# Patient Record
Sex: FEMALE | Race: OTHER | HISPANIC OR LATINO
[De-identification: names, ages, dates, MRNs, and addresses within clinical notes are randomized per-mention and may not be internally consistent; named-entity substitution may affect disease eponyms.]

---

## 2017-01-12 ENCOUNTER — APPOINTMENT (OUTPATIENT)
Dept: UROGYNECOLOGY | Facility: CLINIC | Age: 37
End: 2017-01-12

## 2017-01-25 ENCOUNTER — RECORD ABSTRACTING (OUTPATIENT)
Age: 37
End: 2017-01-25

## 2017-01-25 DIAGNOSIS — Z87.442 PERSONAL HISTORY OF URINARY CALCULI: ICD-10-CM

## 2017-01-30 ENCOUNTER — APPOINTMENT (OUTPATIENT)
Dept: UROGYNECOLOGY | Facility: CLINIC | Age: 37
End: 2017-01-30

## 2017-02-03 ENCOUNTER — APPOINTMENT (OUTPATIENT)
Dept: GASTROENTEROLOGY | Facility: CLINIC | Age: 37
End: 2017-02-03

## 2017-03-28 ENCOUNTER — APPOINTMENT (OUTPATIENT)
Dept: DERMATOLOGY | Facility: CLINIC | Age: 37
End: 2017-03-28

## 2017-06-06 ENCOUNTER — APPOINTMENT (OUTPATIENT)
Dept: OBGYN | Facility: CLINIC | Age: 37
End: 2017-06-06

## 2017-06-06 ENCOUNTER — OUTPATIENT (OUTPATIENT)
Dept: OUTPATIENT SERVICES | Facility: HOSPITAL | Age: 37
LOS: 1 days | Discharge: HOME | End: 2017-06-06

## 2017-06-06 VITALS
HEIGHT: 60 IN | BODY MASS INDEX: 31.22 KG/M2 | DIASTOLIC BLOOD PRESSURE: 60 MMHG | SYSTOLIC BLOOD PRESSURE: 110 MMHG | WEIGHT: 159 LBS

## 2017-06-06 DIAGNOSIS — N10 ACUTE PYELONEPHRITIS: ICD-10-CM

## 2017-06-13 ENCOUNTER — RESULT REVIEW (OUTPATIENT)
Age: 37
End: 2017-06-13

## 2017-06-14 LAB
APPEARANCE UR: CLEAR
BILIRUB UR QL STRIP: NEGATIVE
COLOR UR: YELLOW
GLUCOSE UR STRIP-MCNC: NEGATIVE MG/DL
HGB UR QL STRIP: NEGATIVE
KETONES UR STRIP-MCNC: NEGATIVE MG/DL
NITRITE UR QL STRIP: NEGATIVE
PH UR STRIP: 6
PROT UR STRIP-MCNC: NEGATIVE MG/DL
SP GR UR STRIP: 1.01
UROBILINOGEN UR STRIP-MCNC: 0.2 MG/DL
WBC URNS QL MICRO: NEGATIVE

## 2017-06-15 LAB — BACTERIA UR CULT: NORMAL

## 2017-06-22 ENCOUNTER — APPOINTMENT (OUTPATIENT)
Dept: UROLOGY | Facility: CLINIC | Age: 37
End: 2017-06-22

## 2017-06-28 DIAGNOSIS — D25.9 LEIOMYOMA OF UTERUS, UNSPECIFIED: ICD-10-CM

## 2017-08-25 ENCOUNTER — OUTPATIENT (OUTPATIENT)
Dept: OUTPATIENT SERVICES | Facility: HOSPITAL | Age: 37
LOS: 1 days | Discharge: HOME | End: 2017-08-25

## 2017-08-25 DIAGNOSIS — N10 ACUTE PYELONEPHRITIS: ICD-10-CM

## 2017-08-26 ENCOUNTER — OUTPATIENT (OUTPATIENT)
Dept: OUTPATIENT SERVICES | Facility: HOSPITAL | Age: 37
LOS: 1 days | Discharge: HOME | End: 2017-08-26

## 2017-08-26 DIAGNOSIS — N10 ACUTE PYELONEPHRITIS: ICD-10-CM

## 2017-08-26 DIAGNOSIS — E66.09 OTHER OBESITY DUE TO EXCESS CALORIES: ICD-10-CM

## 2017-12-12 ENCOUNTER — APPOINTMENT (OUTPATIENT)
Dept: OBGYN | Facility: CLINIC | Age: 37
End: 2017-12-12

## 2018-03-15 ENCOUNTER — APPOINTMENT (OUTPATIENT)
Dept: SURGERY | Facility: CLINIC | Age: 38
End: 2018-03-15

## 2018-08-29 ENCOUNTER — EMERGENCY (EMERGENCY)
Facility: HOSPITAL | Age: 38
LOS: 0 days | Discharge: HOME | End: 2018-08-29
Attending: EMERGENCY MEDICINE | Admitting: EMERGENCY MEDICINE

## 2018-08-29 VITALS
TEMPERATURE: 99 F | DIASTOLIC BLOOD PRESSURE: 65 MMHG | HEART RATE: 74 BPM | OXYGEN SATURATION: 98 % | RESPIRATION RATE: 20 BRPM | SYSTOLIC BLOOD PRESSURE: 106 MMHG

## 2018-08-29 DIAGNOSIS — N39.0 URINARY TRACT INFECTION, SITE NOT SPECIFIED: ICD-10-CM

## 2018-08-29 DIAGNOSIS — R10.9 UNSPECIFIED ABDOMINAL PAIN: ICD-10-CM

## 2018-08-29 DIAGNOSIS — M54.5 LOW BACK PAIN: ICD-10-CM

## 2018-08-29 LAB
ANION GAP SERPL CALC-SCNC: 10 MMOL/L — SIGNIFICANT CHANGE UP (ref 7–14)
APPEARANCE UR: CLEAR — SIGNIFICANT CHANGE UP
BASOPHILS # BLD AUTO: 0.07 K/UL — SIGNIFICANT CHANGE UP (ref 0–0.2)
BASOPHILS NFR BLD AUTO: 0.6 % — SIGNIFICANT CHANGE UP (ref 0–1)
BILIRUB UR-MCNC: NEGATIVE — SIGNIFICANT CHANGE UP
BUN SERPL-MCNC: 8 MG/DL — LOW (ref 10–20)
CALCIUM SERPL-MCNC: 8.4 MG/DL — LOW (ref 8.5–10.1)
CHLORIDE SERPL-SCNC: 106 MMOL/L — SIGNIFICANT CHANGE UP (ref 98–110)
CO2 SERPL-SCNC: 23 MMOL/L — SIGNIFICANT CHANGE UP (ref 17–32)
COLOR SPEC: YELLOW — SIGNIFICANT CHANGE UP
CREAT SERPL-MCNC: 0.5 MG/DL — LOW (ref 0.7–1.5)
DIFF PNL FLD: ABNORMAL
EOSINOPHIL # BLD AUTO: 0.18 K/UL — SIGNIFICANT CHANGE UP (ref 0–0.7)
EOSINOPHIL NFR BLD AUTO: 1.5 % — SIGNIFICANT CHANGE UP (ref 0–8)
EPI CELLS # UR: ABNORMAL /HPF
GLUCOSE SERPL-MCNC: 78 MG/DL — SIGNIFICANT CHANGE UP (ref 70–99)
GLUCOSE UR QL: NEGATIVE MG/DL — SIGNIFICANT CHANGE UP
HCT VFR BLD CALC: 37.4 % — SIGNIFICANT CHANGE UP (ref 37–47)
HGB BLD-MCNC: 12.7 G/DL — SIGNIFICANT CHANGE UP (ref 12–16)
IMM GRANULOCYTES NFR BLD AUTO: 0.4 % — HIGH (ref 0.1–0.3)
KETONES UR-MCNC: NEGATIVE — SIGNIFICANT CHANGE UP
LACTATE SERPL-SCNC: 0.9 MMOL/L — SIGNIFICANT CHANGE UP (ref 0.5–2.2)
LEUKOCYTE ESTERASE UR-ACNC: ABNORMAL
LYMPHOCYTES # BLD AUTO: 2.95 K/UL — SIGNIFICANT CHANGE UP (ref 1.2–3.4)
LYMPHOCYTES # BLD AUTO: 24.6 % — SIGNIFICANT CHANGE UP (ref 20.5–51.1)
MCHC RBC-ENTMCNC: 30.4 PG — SIGNIFICANT CHANGE UP (ref 27–31)
MCHC RBC-ENTMCNC: 34 G/DL — SIGNIFICANT CHANGE UP (ref 32–37)
MCV RBC AUTO: 89.5 FL — SIGNIFICANT CHANGE UP (ref 81–99)
MONOCYTES # BLD AUTO: 0.91 K/UL — HIGH (ref 0.1–0.6)
MONOCYTES NFR BLD AUTO: 7.6 % — SIGNIFICANT CHANGE UP (ref 1.7–9.3)
NEUTROPHILS # BLD AUTO: 7.82 K/UL — HIGH (ref 1.4–6.5)
NEUTROPHILS NFR BLD AUTO: 65.3 % — SIGNIFICANT CHANGE UP (ref 42.2–75.2)
NITRITE UR-MCNC: NEGATIVE — SIGNIFICANT CHANGE UP
NRBC # BLD: 0 /100 WBCS — SIGNIFICANT CHANGE UP (ref 0–0)
PH UR: 6.5 — SIGNIFICANT CHANGE UP (ref 5–8)
PLATELET # BLD AUTO: 300 K/UL — SIGNIFICANT CHANGE UP (ref 130–400)
POTASSIUM SERPL-MCNC: 4 MMOL/L — SIGNIFICANT CHANGE UP (ref 3.5–5)
POTASSIUM SERPL-SCNC: 4 MMOL/L — SIGNIFICANT CHANGE UP (ref 3.5–5)
PROT UR-MCNC: NEGATIVE MG/DL — SIGNIFICANT CHANGE UP
RBC # BLD: 4.18 M/UL — LOW (ref 4.2–5.4)
RBC # FLD: 13.5 % — SIGNIFICANT CHANGE UP (ref 11.5–14.5)
RBC CASTS # UR COMP ASSIST: SIGNIFICANT CHANGE UP /HPF
SODIUM SERPL-SCNC: 139 MMOL/L — SIGNIFICANT CHANGE UP (ref 135–146)
SP GR SPEC: <=1.005 — SIGNIFICANT CHANGE UP (ref 1.01–1.03)
UROBILINOGEN FLD QL: 0.2 MG/DL — SIGNIFICANT CHANGE UP (ref 0.2–0.2)
WBC # BLD: 11.98 K/UL — HIGH (ref 4.8–10.8)
WBC # FLD AUTO: 11.98 K/UL — HIGH (ref 4.8–10.8)
WBC UR QL: ABNORMAL /HPF

## 2018-08-29 RX ORDER — KETOROLAC TROMETHAMINE 30 MG/ML
15 SYRINGE (ML) INJECTION ONCE
Qty: 0 | Refills: 0 | Status: DISCONTINUED | OUTPATIENT
Start: 2018-08-29 | End: 2018-08-29

## 2018-08-29 RX ORDER — SODIUM CHLORIDE 9 MG/ML
1000 INJECTION INTRAMUSCULAR; INTRAVENOUS; SUBCUTANEOUS ONCE
Qty: 0 | Refills: 0 | Status: COMPLETED | OUTPATIENT
Start: 2018-08-29 | End: 2018-08-29

## 2018-08-29 RX ORDER — NITROFURANTOIN MACROCRYSTAL 50 MG
1 CAPSULE ORAL
Qty: 14 | Refills: 0 | OUTPATIENT
Start: 2018-08-29 | End: 2018-09-04

## 2018-08-29 RX ORDER — METHOCARBAMOL 500 MG/1
1000 TABLET, FILM COATED ORAL ONCE
Qty: 0 | Refills: 0 | Status: COMPLETED | OUTPATIENT
Start: 2018-08-29 | End: 2018-08-29

## 2018-08-29 RX ORDER — IBUPROFEN 200 MG
1 TABLET ORAL
Qty: 21 | Refills: 0 | OUTPATIENT
Start: 2018-08-29 | End: 2018-09-04

## 2018-08-29 RX ORDER — MORPHINE SULFATE 50 MG/1
4 CAPSULE, EXTENDED RELEASE ORAL ONCE
Qty: 0 | Refills: 0 | Status: DISCONTINUED | OUTPATIENT
Start: 2018-08-29 | End: 2018-08-29

## 2018-08-29 RX ORDER — METHOCARBAMOL 500 MG/1
1 TABLET, FILM COATED ORAL
Qty: 21 | Refills: 0 | OUTPATIENT
Start: 2018-08-29 | End: 2018-09-04

## 2018-08-29 RX ADMIN — Medication 15 MILLIGRAM(S): at 09:35

## 2018-08-29 RX ADMIN — SODIUM CHLORIDE 1000 MILLILITER(S): 9 INJECTION INTRAMUSCULAR; INTRAVENOUS; SUBCUTANEOUS at 09:19

## 2018-08-29 RX ADMIN — Medication 15 MILLIGRAM(S): at 12:04

## 2018-08-29 RX ADMIN — MORPHINE SULFATE 4 MILLIGRAM(S): 50 CAPSULE, EXTENDED RELEASE ORAL at 12:04

## 2018-08-29 RX ADMIN — MORPHINE SULFATE 4 MILLIGRAM(S): 50 CAPSULE, EXTENDED RELEASE ORAL at 09:50

## 2018-08-29 RX ADMIN — SODIUM CHLORIDE 1000 MILLILITER(S): 9 INJECTION INTRAMUSCULAR; INTRAVENOUS; SUBCUTANEOUS at 12:04

## 2018-08-29 RX ADMIN — METHOCARBAMOL 1000 MILLIGRAM(S): 500 TABLET, FILM COATED ORAL at 09:35

## 2018-08-29 NOTE — ED PROVIDER NOTE - ATTENDING CONTRIBUTION TO CARE
37 y/o F here with L lower back pain x 1 week.  Not relieved with tylenol or ibuprofen this week.  Also noted dysuria today. no fever.  h/o pyelonephritis in the past that feels the same.  no vag d/c or bleeding.  LMP 3 weeks ago. No n/v/d.  normal, but frequent BMs.  No PMH.  NKDA.  EXAM: well appearing. NAD. s1s2, reg. CTAB. abd soft, nd, nt.  +l lower back/CVAT.  Pain worse with trying to sit up and tristing to the right.  P: labs, ua, uhcg, ivf.

## 2018-08-29 NOTE — ED PROVIDER NOTE - MEDICAL DECISION MAKING DETAILS
Pt feels better. Ready for d/c home.  Will treat with abx for dysuria and muscle relaxant for the back pain.

## 2018-08-29 NOTE — ED PROVIDER NOTE - OBJECTIVE STATEMENT
37 y/o female presents to the ED c/o "I have left side pain for 1 week. I also have lower stomach pressure, frequent urination, burning and discomfort since this morning. " no n/v/d/ fever/ weakness/ hx trauma/ heavy lifting

## 2018-08-29 NOTE — ED ADULT NURSE NOTE - NSIMPLEMENTINTERV_GEN_ALL_ED
Implemented All Universal Safety Interventions:  [place] to call system. Call bell, personal items and telephone within reach. Instruct patient to call for assistance. Room bathroom lighting operational. Non-slip footwear when patient is off stretcher. Physically safe environment: no spills, clutter or unnecessary equipment. Stretcher in lowest position, wheels locked, appropriate side rails in place.

## 2018-08-30 LAB
CULTURE RESULTS: NO GROWTH — SIGNIFICANT CHANGE UP
SPECIMEN SOURCE: SIGNIFICANT CHANGE UP

## 2018-09-17 ENCOUNTER — EMERGENCY (EMERGENCY)
Facility: HOSPITAL | Age: 38
LOS: 0 days | Discharge: HOME | End: 2018-09-17
Admitting: EMERGENCY MEDICINE

## 2018-09-17 VITALS
DIASTOLIC BLOOD PRESSURE: 74 MMHG | SYSTOLIC BLOOD PRESSURE: 128 MMHG | RESPIRATION RATE: 18 BRPM | OXYGEN SATURATION: 96 % | TEMPERATURE: 99 F | HEART RATE: 82 BPM

## 2018-09-17 VITALS
OXYGEN SATURATION: 98 % | RESPIRATION RATE: 18 BRPM | DIASTOLIC BLOOD PRESSURE: 75 MMHG | TEMPERATURE: 100 F | SYSTOLIC BLOOD PRESSURE: 111 MMHG | HEART RATE: 90 BPM

## 2018-09-17 DIAGNOSIS — Z79.1 LONG TERM (CURRENT) USE OF NON-STEROIDAL ANTI-INFLAMMATORIES (NSAID): ICD-10-CM

## 2018-09-17 DIAGNOSIS — Z79.899 OTHER LONG TERM (CURRENT) DRUG THERAPY: ICD-10-CM

## 2018-09-17 DIAGNOSIS — Z79.2 LONG TERM (CURRENT) USE OF ANTIBIOTICS: ICD-10-CM

## 2018-09-17 DIAGNOSIS — Z87.440 PERSONAL HISTORY OF URINARY (TRACT) INFECTIONS: ICD-10-CM

## 2018-09-17 DIAGNOSIS — R50.9 FEVER, UNSPECIFIED: ICD-10-CM

## 2018-09-17 DIAGNOSIS — N83.202 UNSPECIFIED OVARIAN CYST, LEFT SIDE: ICD-10-CM

## 2018-09-17 LAB
ANION GAP SERPL CALC-SCNC: 18 MMOL/L — HIGH (ref 7–14)
APPEARANCE UR: CLEAR — SIGNIFICANT CHANGE UP
BASOPHILS # BLD AUTO: 0.05 K/UL — SIGNIFICANT CHANGE UP (ref 0–0.2)
BASOPHILS NFR BLD AUTO: 0.9 % — SIGNIFICANT CHANGE UP (ref 0–1)
BILIRUB UR-MCNC: NEGATIVE — SIGNIFICANT CHANGE UP
BUN SERPL-MCNC: 8 MG/DL — LOW (ref 10–20)
CALCIUM SERPL-MCNC: 9.2 MG/DL — SIGNIFICANT CHANGE UP (ref 8.5–10.1)
CHLORIDE SERPL-SCNC: 100 MMOL/L — SIGNIFICANT CHANGE UP (ref 98–110)
CO2 SERPL-SCNC: 22 MMOL/L — SIGNIFICANT CHANGE UP (ref 17–32)
COLOR SPEC: YELLOW — SIGNIFICANT CHANGE UP
CREAT SERPL-MCNC: 0.6 MG/DL — LOW (ref 0.7–1.5)
DIFF PNL FLD: ABNORMAL
EOSINOPHIL # BLD AUTO: 0.13 K/UL — SIGNIFICANT CHANGE UP (ref 0–0.7)
EOSINOPHIL NFR BLD AUTO: 2.4 % — SIGNIFICANT CHANGE UP (ref 0–8)
GLUCOSE SERPL-MCNC: 148 MG/DL — HIGH (ref 70–99)
GLUCOSE UR QL: NEGATIVE MG/DL — SIGNIFICANT CHANGE UP
HCT VFR BLD CALC: 40 % — SIGNIFICANT CHANGE UP (ref 37–47)
HGB BLD-MCNC: 13.9 G/DL — SIGNIFICANT CHANGE UP (ref 12–16)
IMM GRANULOCYTES NFR BLD AUTO: 0.4 % — HIGH (ref 0.1–0.3)
KETONES UR-MCNC: NEGATIVE — SIGNIFICANT CHANGE UP
LACTATE SERPL-SCNC: 2 MMOL/L — SIGNIFICANT CHANGE UP (ref 0.5–2.2)
LEUKOCYTE ESTERASE UR-ACNC: NEGATIVE — SIGNIFICANT CHANGE UP
LYMPHOCYTES # BLD AUTO: 1.47 K/UL — SIGNIFICANT CHANGE UP (ref 1.2–3.4)
LYMPHOCYTES # BLD AUTO: 27.3 % — SIGNIFICANT CHANGE UP (ref 20.5–51.1)
MCHC RBC-ENTMCNC: 30.8 PG — SIGNIFICANT CHANGE UP (ref 27–31)
MCHC RBC-ENTMCNC: 34.8 G/DL — SIGNIFICANT CHANGE UP (ref 32–37)
MCV RBC AUTO: 88.5 FL — SIGNIFICANT CHANGE UP (ref 81–99)
MONOCYTES # BLD AUTO: 0.9 K/UL — HIGH (ref 0.1–0.6)
MONOCYTES NFR BLD AUTO: 16.7 % — HIGH (ref 1.7–9.3)
NEUTROPHILS # BLD AUTO: 2.81 K/UL — SIGNIFICANT CHANGE UP (ref 1.4–6.5)
NEUTROPHILS NFR BLD AUTO: 52.3 % — SIGNIFICANT CHANGE UP (ref 42.2–75.2)
NITRITE UR-MCNC: NEGATIVE — SIGNIFICANT CHANGE UP
NRBC # BLD: 0 /100 WBCS — SIGNIFICANT CHANGE UP (ref 0–0)
PH UR: 6.5 — SIGNIFICANT CHANGE UP (ref 5–8)
PLATELET # BLD AUTO: 273 K/UL — SIGNIFICANT CHANGE UP (ref 130–400)
POTASSIUM SERPL-MCNC: 3.9 MMOL/L — SIGNIFICANT CHANGE UP (ref 3.5–5)
POTASSIUM SERPL-SCNC: 3.9 MMOL/L — SIGNIFICANT CHANGE UP (ref 3.5–5)
PROT UR-MCNC: NEGATIVE MG/DL — SIGNIFICANT CHANGE UP
RBC # BLD: 4.52 M/UL — SIGNIFICANT CHANGE UP (ref 4.2–5.4)
RBC # FLD: 13.2 % — SIGNIFICANT CHANGE UP (ref 11.5–14.5)
RBC CASTS # UR COMP ASSIST: ABNORMAL /HPF
SODIUM SERPL-SCNC: 140 MMOL/L — SIGNIFICANT CHANGE UP (ref 135–146)
SP GR SPEC: 1.01 — SIGNIFICANT CHANGE UP (ref 1.01–1.03)
UROBILINOGEN FLD QL: 0.2 MG/DL — SIGNIFICANT CHANGE UP (ref 0.2–0.2)
WBC # BLD: 5.38 K/UL — SIGNIFICANT CHANGE UP (ref 4.8–10.8)
WBC # FLD AUTO: 5.38 K/UL — SIGNIFICANT CHANGE UP (ref 4.8–10.8)

## 2018-09-17 RX ORDER — KETOROLAC TROMETHAMINE 30 MG/ML
30 SYRINGE (ML) INJECTION ONCE
Qty: 0 | Refills: 0 | Status: DISCONTINUED | OUTPATIENT
Start: 2018-09-17 | End: 2018-09-17

## 2018-09-17 RX ADMIN — Medication 30 MILLIGRAM(S): at 19:39

## 2018-09-17 NOTE — ED ADULT NURSE NOTE - NSIMPLEMENTINTERV_GEN_ALL_ED
Implemented All Universal Safety Interventions:  [place] to call system. Call bell, personal items and telephone within reach. Instruct patient to call for assistance. Room bathroom lighting operational. Non-slip footwear when patient is off stretcher. Physically safe environment: no spills, clutter or unnecessary equipment. Stretcher in lowest position, wheels locked, appropriate side rails in place.

## 2018-09-17 NOTE — ED PROVIDER NOTE - OBJECTIVE STATEMENT
Patient is a 38 years old F presents to the ED for evaluation of fever and pelvic pain x weeks. As per patient she was seen here earlier this month for same complaint and diagnosed with UTI for which she started on abx but now sts vaginal pain/suprapubic with continued left flank pain. Temp of 101 at home, no nausea, vomiting, diarrhea, no recent travel, +son with sore throat and fever (also being seen today in the ED).

## 2018-09-17 NOTE — ED PROVIDER NOTE - PHYSICAL EXAMINATION
Gen: Alert, NAD, well appearing  Pulm: Bilateral BS, normal resp effort, no wheeze/stridor/retractions  CV: RRR, no M/R/G, +dist pulses  Abd: soft, +suprapubic tenderness  Pelvic: (Chaperone by DEB Zapata) Normal vaginal exam minimal white d/c no cmt no left adnexa tenderness   Neuro: AAOx3

## 2018-09-17 NOTE — ED PROVIDER NOTE - NS ED ROS FT
Review of Systems  Constitutional: (+) fever  Eyes/ENT: (-) blurry vision, (-) epistaxis  Cardiovascular: (-) chest pain, (-) syncope  Respiratory: (-) cough, (-) shortness of breath  Gastrointestinal: (-) vomiting, (-) diarrhea  Musculoskeletal: (-) neck pain, (+) back pain, (-) joint pain  Integumentary: (-) rash, (-) edema  Neurological: (-) headache

## 2018-09-18 LAB
C TRACH RRNA SPEC QL NAA+PROBE: SIGNIFICANT CHANGE UP
N GONORRHOEA RRNA SPEC QL NAA+PROBE: SIGNIFICANT CHANGE UP
SPECIMEN SOURCE: SIGNIFICANT CHANGE UP

## 2018-10-02 ENCOUNTER — OUTPATIENT (OUTPATIENT)
Dept: OUTPATIENT SERVICES | Facility: HOSPITAL | Age: 38
LOS: 1 days | Discharge: HOME | End: 2018-10-02

## 2018-10-02 ENCOUNTER — APPOINTMENT (OUTPATIENT)
Dept: OBGYN | Facility: CLINIC | Age: 38
End: 2018-10-02

## 2018-10-02 VITALS — SYSTOLIC BLOOD PRESSURE: 120 MMHG | DIASTOLIC BLOOD PRESSURE: 78 MMHG

## 2018-10-02 PROBLEM — D25.9 LEIOMYOMA OF UTERUS, UNSPECIFIED: Chronic | Status: ACTIVE | Noted: 2018-09-17

## 2018-10-02 PROBLEM — N83.209 UNSPECIFIED OVARIAN CYST, UNSPECIFIED SIDE: Chronic | Status: ACTIVE | Noted: 2018-09-17

## 2018-10-02 PROBLEM — N39.0 URINARY TRACT INFECTION, SITE NOT SPECIFIED: Chronic | Status: ACTIVE | Noted: 2018-09-17

## 2018-10-05 ENCOUNTER — LABORATORY RESULT (OUTPATIENT)
Age: 38
End: 2018-10-05

## 2018-10-09 LAB
C TRACH RRNA SPEC QL NAA+PROBE: NOT DETECTED
N GONORRHOEA RRNA SPEC QL NAA+PROBE: NOT DETECTED
SOURCE AMPLIFICATION: NORMAL

## 2018-10-11 DIAGNOSIS — Z01.411 ENCOUNTER FOR GYNECOLOGICAL EXAMINATION (GENERAL) (ROUTINE) WITH ABNORMAL FINDINGS: ICD-10-CM

## 2018-10-12 ENCOUNTER — APPOINTMENT (OUTPATIENT)
Dept: ANTEPARTUM | Facility: CLINIC | Age: 38
End: 2018-10-12

## 2018-10-15 DIAGNOSIS — D25.1 INTRAMURAL LEIOMYOMA OF UTERUS: ICD-10-CM

## 2018-10-16 LAB — HPV HIGH+LOW RISK DNA PNL CVX: NOT DETECTED

## 2018-11-06 ENCOUNTER — APPOINTMENT (OUTPATIENT)
Dept: OBGYN | Facility: CLINIC | Age: 38
End: 2018-11-06

## 2018-11-23 ENCOUNTER — OUTPATIENT (OUTPATIENT)
Dept: OUTPATIENT SERVICES | Facility: HOSPITAL | Age: 38
LOS: 1 days | Discharge: HOME | End: 2018-11-23

## 2018-11-23 ENCOUNTER — APPOINTMENT (OUTPATIENT)
Dept: UROGYNECOLOGY | Facility: CLINIC | Age: 38
End: 2018-11-23

## 2018-11-23 VITALS
SYSTOLIC BLOOD PRESSURE: 106 MMHG | DIASTOLIC BLOOD PRESSURE: 70 MMHG | HEIGHT: 60 IN | WEIGHT: 161 LBS | BODY MASS INDEX: 31.61 KG/M2

## 2018-11-23 DIAGNOSIS — R93.41 ABNORMAL RADIOLOGIC FINDINGS ON DIAGNOSTIC IMAGING OF OF RENAL PELVIS, URETER, OR BLADDER: ICD-10-CM

## 2018-11-23 DIAGNOSIS — Z87.448 PERSONAL HISTORY OF OTHER DISEASES OF URINARY SYSTEM: ICD-10-CM

## 2018-11-23 DIAGNOSIS — Z87.440 PERSONAL HISTORY OF URINARY (TRACT) INFECTIONS: ICD-10-CM

## 2018-11-23 DIAGNOSIS — M54.42 LUMBAGO WITH SCIATICA, LEFT SIDE: ICD-10-CM

## 2018-11-23 DIAGNOSIS — Z87.898 PERSONAL HISTORY OF OTHER SPECIFIED CONDITIONS: ICD-10-CM

## 2018-11-23 DIAGNOSIS — R21 RASH AND OTHER NONSPECIFIC SKIN ERUPTION: ICD-10-CM

## 2018-11-23 DIAGNOSIS — Z86.018 PERSONAL HISTORY OF OTHER BENIGN NEOPLASM: ICD-10-CM

## 2018-11-23 DIAGNOSIS — N81.6 RECTOCELE: ICD-10-CM

## 2018-11-23 DIAGNOSIS — N02.9 RECURRENT AND PERSISTENT HEMATURIA WITH UNSPECIFIED MORPHOLOGIC CHANGES: ICD-10-CM

## 2018-11-23 DIAGNOSIS — M54.41 LUMBAGO WITH SCIATICA, LEFT SIDE: ICD-10-CM

## 2018-11-23 DIAGNOSIS — R10.9 UNSPECIFIED ABDOMINAL PAIN: ICD-10-CM

## 2018-11-23 DIAGNOSIS — N39.46 MIXED INCONTINENCE: ICD-10-CM

## 2018-11-23 DIAGNOSIS — N39.3 STRESS INCONTINENCE (FEMALE) (MALE): ICD-10-CM

## 2018-11-23 DIAGNOSIS — N89.8 OTHER SPECIFIED NONINFLAMMATORY DISORDERS OF VAGINA: ICD-10-CM

## 2018-11-23 DIAGNOSIS — R39.89 OTHER SYMPTOMS AND SIGNS INVOLVING THE GENITOURINARY SYSTEM: ICD-10-CM

## 2018-11-23 DIAGNOSIS — Z78.9 OTHER SPECIFIED HEALTH STATUS: ICD-10-CM

## 2018-11-23 DIAGNOSIS — Z12.72 ENCOUNTER FOR SCREENING FOR MALIGNANT NEOPLASM OF VAGINA: ICD-10-CM

## 2018-11-26 LAB
APPEARANCE: CLEAR
BACTERIA UR CULT: NORMAL
BILIRUBIN URINE: NEGATIVE
BLOOD URINE: NEGATIVE
COLOR: YELLOW
GLUCOSE QUALITATIVE U: NEGATIVE MG/DL
KETONES URINE: NEGATIVE
LEUKOCYTE ESTERASE URINE: NEGATIVE
NITRITE URINE: NEGATIVE
PH URINE: 6
PROTEIN URINE: NEGATIVE MG/DL
SPECIFIC GRAVITY URINE: 1.01
UROBILINOGEN URINE: 0.2 MG/DL (ref 0.2–?)

## 2018-11-29 DIAGNOSIS — K59.00 CONSTIPATION, UNSPECIFIED: ICD-10-CM

## 2018-11-29 DIAGNOSIS — M79.10 MYALGIA, UNSPECIFIED SITE: ICD-10-CM

## 2018-12-20 ENCOUNTER — RESULT CHARGE (OUTPATIENT)
Age: 38
End: 2018-12-20

## 2018-12-21 ENCOUNTER — APPOINTMENT (OUTPATIENT)
Dept: UROGYNECOLOGY | Facility: CLINIC | Age: 38
End: 2018-12-21

## 2018-12-21 ENCOUNTER — OUTPATIENT (OUTPATIENT)
Dept: OUTPATIENT SERVICES | Facility: HOSPITAL | Age: 38
LOS: 1 days | Discharge: HOME | End: 2018-12-21

## 2018-12-24 LAB
BILIRUB UR QL STRIP: NORMAL
CLARITY UR: CLEAR
COLLECTION METHOD: NORMAL
GLUCOSE UR-MCNC: NORMAL
HCG UR QL: 0.2 EU/DL
HCG UR QL: NEGATIVE
HGB UR QL STRIP.AUTO: NORMAL
KETONES UR-MCNC: NORMAL
LEUKOCYTE ESTERASE UR QL STRIP: NORMAL
NITRITE UR QL STRIP: NORMAL
PH UR STRIP: 6.5
PROT UR STRIP-MCNC: NORMAL
QUALITY CONTROL: YES
SP GR UR STRIP: 1.02

## 2018-12-26 DIAGNOSIS — N39.46 MIXED INCONTINENCE: ICD-10-CM

## 2018-12-26 DIAGNOSIS — M79.10 MYALGIA, UNSPECIFIED SITE: ICD-10-CM

## 2018-12-26 DIAGNOSIS — N81.6 RECTOCELE: ICD-10-CM

## 2018-12-27 ENCOUNTER — APPOINTMENT (OUTPATIENT)
Dept: OBGYN | Facility: CLINIC | Age: 38
End: 2018-12-27

## 2018-12-27 ENCOUNTER — OUTPATIENT (OUTPATIENT)
Dept: OUTPATIENT SERVICES | Facility: HOSPITAL | Age: 38
LOS: 1 days | Discharge: HOME | End: 2018-12-27

## 2018-12-27 VITALS
HEIGHT: 60 IN | BODY MASS INDEX: 32.48 KG/M2 | DIASTOLIC BLOOD PRESSURE: 82 MMHG | SYSTOLIC BLOOD PRESSURE: 118 MMHG | WEIGHT: 165.44 LBS

## 2018-12-28 DIAGNOSIS — R10.2 PELVIC AND PERINEAL PAIN: ICD-10-CM

## 2019-01-02 ENCOUNTER — OTHER (OUTPATIENT)
Age: 39
End: 2019-01-02

## 2019-01-14 ENCOUNTER — EMERGENCY (EMERGENCY)
Facility: HOSPITAL | Age: 39
LOS: 0 days | Discharge: HOME | End: 2019-01-14
Admitting: PHYSICIAN ASSISTANT

## 2019-01-14 VITALS
RESPIRATION RATE: 18 BRPM | TEMPERATURE: 99 F | SYSTOLIC BLOOD PRESSURE: 110 MMHG | HEART RATE: 84 BPM | DIASTOLIC BLOOD PRESSURE: 58 MMHG | OXYGEN SATURATION: 98 %

## 2019-01-14 VITALS — HEIGHT: 62 IN | WEIGHT: 162.04 LBS

## 2019-01-14 DIAGNOSIS — L50.9 URTICARIA, UNSPECIFIED: ICD-10-CM

## 2019-01-14 DIAGNOSIS — R11.2 NAUSEA WITH VOMITING, UNSPECIFIED: ICD-10-CM

## 2019-01-14 DIAGNOSIS — R21 RASH AND OTHER NONSPECIFIC SKIN ERUPTION: ICD-10-CM

## 2019-01-14 DIAGNOSIS — L29.9 PRURITUS, UNSPECIFIED: ICD-10-CM

## 2019-01-14 DIAGNOSIS — R35.0 FREQUENCY OF MICTURITION: ICD-10-CM

## 2019-01-14 LAB
APPEARANCE UR: CLEAR — SIGNIFICANT CHANGE UP
BILIRUB UR-MCNC: NEGATIVE — SIGNIFICANT CHANGE UP
COLOR SPEC: YELLOW — SIGNIFICANT CHANGE UP
DIFF PNL FLD: ABNORMAL
GLUCOSE UR QL: NEGATIVE MG/DL — SIGNIFICANT CHANGE UP
KETONES UR-MCNC: NEGATIVE — SIGNIFICANT CHANGE UP
LEUKOCYTE ESTERASE UR-ACNC: NEGATIVE — SIGNIFICANT CHANGE UP
NITRITE UR-MCNC: NEGATIVE — SIGNIFICANT CHANGE UP
PH UR: 6.5 — SIGNIFICANT CHANGE UP (ref 5–8)
PROT UR-MCNC: NEGATIVE MG/DL — SIGNIFICANT CHANGE UP
RBC CASTS # UR COMP ASSIST: SIGNIFICANT CHANGE UP /HPF
SP GR SPEC: <=1.005 — SIGNIFICANT CHANGE UP (ref 1.01–1.03)
UROBILINOGEN FLD QL: 0.2 MG/DL — SIGNIFICANT CHANGE UP (ref 0.2–0.2)

## 2019-01-14 RX ORDER — DIPHENHYDRAMINE HCL 50 MG
2 CAPSULE ORAL
Qty: 24 | Refills: 0 | OUTPATIENT
Start: 2019-01-14 | End: 2019-01-16

## 2019-01-14 RX ORDER — DEXAMETHASONE 0.5 MG/5ML
10 ELIXIR ORAL ONCE
Qty: 0 | Refills: 0 | Status: COMPLETED | OUTPATIENT
Start: 2019-01-14 | End: 2019-01-14

## 2019-01-14 RX ORDER — DIPHENHYDRAMINE HCL 50 MG
50 CAPSULE ORAL ONCE
Qty: 0 | Refills: 0 | Status: COMPLETED | OUTPATIENT
Start: 2019-01-14 | End: 2019-01-14

## 2019-01-14 RX ORDER — PERMETHRIN CREAM 5% W/W 50 MG/G
1 CREAM TOPICAL
Qty: 30 | Refills: 0 | OUTPATIENT
Start: 2019-01-14 | End: 2019-01-14

## 2019-01-14 RX ADMIN — Medication 50 MILLIGRAM(S): at 21:46

## 2019-01-14 RX ADMIN — Medication 10 MILLIGRAM(S): at 21:46

## 2019-01-14 NOTE — ED PROVIDER NOTE - MEDICAL DECISION MAKING DETAILS
Pt presents to the ED c/o diffuse pruritic rash. Exam shows erythematous papular rash with involvement of interdigit spaces. Given decadron/benadryl and prescribed permethrin. Pt instructed to follow-up with derm. Return precautions given. Agreeable to d/c.

## 2019-01-14 NOTE — ED ADULT NURSE NOTE - OBJECTIVE STATEMENT
Pt c/o of all over body hives that are small reddened and itchy for the past week. No new medications, food intake or allergens. No new lotions or creams or detergents. States benadryl did not help the one dose she took. Has became increasingly itchy over the past few days. No SOB, abdominal pain, facial swelling, throat swelling.

## 2019-01-14 NOTE — ED PROVIDER NOTE - PHYSICAL EXAMINATION
VITAL SIGNS: I have reviewed nursing notes and confirm.  CONSTITUTIONAL: Well-developed; well-nourished; in no acute distress.  SKIN: Skin exam is warm and dry. (+) diffuse erythematous papular rash, involving interdigit spaces. Non-tender. No involvement of palms and soles.   HEAD: Normocephalic; atraumatic.  EYES: Conjunctiva and sclera clear.  ENT: No nasal discharge; airway clear. Oropharynx clear. Uvula midline.   NECK: Supple; non tender.  CARD: S1, S2 normal; no murmurs, gallops, or rubs. Regular rate and rhythm.  RESP: No wheezes, rales or rhonchi. Speaking in full sentences.   EXT: Normal ROM. No clubbing, cyanosis or edema.  NEURO: Alert, oriented. Grossly unremarkable. No focal deficits.

## 2019-01-14 NOTE — ED PROVIDER NOTE - CARE PROVIDER_API CALL
Rick Houston), Dermatology; Internal Medicine  [address] [place] [address]  Phone: [phone]  Fax: [phone]

## 2019-01-14 NOTE — ED PROVIDER NOTE - OBJECTIVE STATEMENT
39 yo F with no significant PMHx presents to the ED c/o diffuse rash. Symptoms started 1 week ago and have been persisting. Pt admits to associated pruritis. Pt tried taking Benadryl once without improvement in symptoms. Pt denies fever, chills, nausea, vomiting, abdominal pain, chest pain, SOB, throat pain, sick contacts, recent travel.

## 2019-01-15 LAB
CULTURE RESULTS: NO GROWTH — SIGNIFICANT CHANGE UP
SPECIMEN SOURCE: SIGNIFICANT CHANGE UP

## 2019-02-01 ENCOUNTER — APPOINTMENT (OUTPATIENT)
Dept: UROGYNECOLOGY | Facility: CLINIC | Age: 39
End: 2019-02-01

## 2019-03-05 ENCOUNTER — OUTPATIENT (OUTPATIENT)
Dept: OUTPATIENT SERVICES | Facility: HOSPITAL | Age: 39
LOS: 1 days | Discharge: HOME | End: 2019-03-05

## 2019-03-05 VITALS
RESPIRATION RATE: 16 BRPM | HEIGHT: 59.06 IN | OXYGEN SATURATION: 99 % | TEMPERATURE: 98 F | DIASTOLIC BLOOD PRESSURE: 68 MMHG | HEART RATE: 76 BPM | WEIGHT: 165.35 LBS | SYSTOLIC BLOOD PRESSURE: 102 MMHG

## 2019-03-05 DIAGNOSIS — N39.46 MIXED INCONTINENCE: ICD-10-CM

## 2019-03-05 DIAGNOSIS — N18.6 END STAGE RENAL DISEASE: ICD-10-CM

## 2019-03-05 DIAGNOSIS — Z01.818 ENCOUNTER FOR OTHER PREPROCEDURAL EXAMINATION: ICD-10-CM

## 2019-03-05 LAB
ANION GAP SERPL CALC-SCNC: 10 MMOL/L — SIGNIFICANT CHANGE UP (ref 7–14)
APPEARANCE UR: CLEAR — SIGNIFICANT CHANGE UP
APTT BLD: 33.3 SEC — SIGNIFICANT CHANGE UP (ref 27–39.2)
BASOPHILS # BLD AUTO: 0.04 K/UL — SIGNIFICANT CHANGE UP (ref 0–0.2)
BASOPHILS NFR BLD AUTO: 0.6 % — SIGNIFICANT CHANGE UP (ref 0–1)
BILIRUB UR-MCNC: NEGATIVE — SIGNIFICANT CHANGE UP
BUN SERPL-MCNC: 11 MG/DL — SIGNIFICANT CHANGE UP (ref 10–20)
CALCIUM SERPL-MCNC: 8.7 MG/DL — SIGNIFICANT CHANGE UP (ref 8.5–10.1)
CHLORIDE SERPL-SCNC: 107 MMOL/L — SIGNIFICANT CHANGE UP (ref 98–110)
CO2 SERPL-SCNC: 24 MMOL/L — SIGNIFICANT CHANGE UP (ref 17–32)
COLOR SPEC: YELLOW — SIGNIFICANT CHANGE UP
CREAT SERPL-MCNC: 0.6 MG/DL — LOW (ref 0.7–1.5)
DIFF PNL FLD: ABNORMAL
EOSINOPHIL # BLD AUTO: 0.32 K/UL — SIGNIFICANT CHANGE UP (ref 0–0.7)
EOSINOPHIL NFR BLD AUTO: 4.7 % — SIGNIFICANT CHANGE UP (ref 0–8)
EPI CELLS # UR: ABNORMAL /HPF
GLUCOSE SERPL-MCNC: 84 MG/DL — SIGNIFICANT CHANGE UP (ref 70–99)
GLUCOSE UR QL: NEGATIVE MG/DL — SIGNIFICANT CHANGE UP
HCT VFR BLD CALC: 40.1 % — SIGNIFICANT CHANGE UP (ref 37–47)
HGB BLD-MCNC: 13.1 G/DL — SIGNIFICANT CHANGE UP (ref 12–16)
IMM GRANULOCYTES NFR BLD AUTO: 0.4 % — HIGH (ref 0.1–0.3)
INR BLD: 0.96 RATIO — SIGNIFICANT CHANGE UP (ref 0.65–1.3)
KETONES UR-MCNC: NEGATIVE — SIGNIFICANT CHANGE UP
LEUKOCYTE ESTERASE UR-ACNC: NEGATIVE — SIGNIFICANT CHANGE UP
LYMPHOCYTES # BLD AUTO: 2.7 K/UL — SIGNIFICANT CHANGE UP (ref 1.2–3.4)
LYMPHOCYTES # BLD AUTO: 39.8 % — SIGNIFICANT CHANGE UP (ref 20.5–51.1)
MCHC RBC-ENTMCNC: 29.6 PG — SIGNIFICANT CHANGE UP (ref 27–31)
MCHC RBC-ENTMCNC: 32.7 G/DL — SIGNIFICANT CHANGE UP (ref 32–37)
MCV RBC AUTO: 90.7 FL — SIGNIFICANT CHANGE UP (ref 81–99)
MONOCYTES # BLD AUTO: 0.68 K/UL — HIGH (ref 0.1–0.6)
MONOCYTES NFR BLD AUTO: 10 % — HIGH (ref 1.7–9.3)
NEUTROPHILS # BLD AUTO: 3.01 K/UL — SIGNIFICANT CHANGE UP (ref 1.4–6.5)
NEUTROPHILS NFR BLD AUTO: 44.5 % — SIGNIFICANT CHANGE UP (ref 42.2–75.2)
NITRITE UR-MCNC: NEGATIVE — SIGNIFICANT CHANGE UP
NRBC # BLD: 0 /100 WBCS — SIGNIFICANT CHANGE UP (ref 0–0)
PH UR: 6 — SIGNIFICANT CHANGE UP (ref 5–8)
PLATELET # BLD AUTO: 324 K/UL — SIGNIFICANT CHANGE UP (ref 130–400)
POTASSIUM SERPL-MCNC: 4.1 MMOL/L — SIGNIFICANT CHANGE UP (ref 3.5–5)
POTASSIUM SERPL-SCNC: 4.1 MMOL/L — SIGNIFICANT CHANGE UP (ref 3.5–5)
PROT UR-MCNC: NEGATIVE MG/DL — SIGNIFICANT CHANGE UP
PROTHROM AB SERPL-ACNC: 11 SEC — SIGNIFICANT CHANGE UP (ref 9.95–12.87)
RBC # BLD: 4.42 M/UL — SIGNIFICANT CHANGE UP (ref 4.2–5.4)
RBC # FLD: 13.7 % — SIGNIFICANT CHANGE UP (ref 11.5–14.5)
RBC CASTS # UR COMP ASSIST: ABNORMAL /HPF
SODIUM SERPL-SCNC: 141 MMOL/L — SIGNIFICANT CHANGE UP (ref 135–146)
SP GR SPEC: 1.02 — SIGNIFICANT CHANGE UP (ref 1.01–1.03)
UROBILINOGEN FLD QL: 0.2 MG/DL — SIGNIFICANT CHANGE UP (ref 0.2–0.2)
WBC # BLD: 6.78 K/UL — SIGNIFICANT CHANGE UP (ref 4.8–10.8)
WBC # FLD AUTO: 6.78 K/UL — SIGNIFICANT CHANGE UP (ref 4.8–10.8)

## 2019-03-05 NOTE — H&P PST ADULT - ATTENDING COMMENTS
The risks and benefits of the use of polypropylene mesh and the use of [employer]’s pelvic floor mesh was previously discussed and the patient wishes to proceed with its use.

## 2019-03-05 NOTE — H&P PST ADULT - HISTORY OF PRESENT ILLNESS
38 Y/O FEMALE PRESENTS TO PAST WITH HX URINARY INCONTINENCE   PT NOW FOR SCHEDULED PROCEDURE. PT DENIES ANY CP SOB PALP COUGH DYSURIA FEVER URI. PT ABLE TO JOHNATHON 1-2 FOS W/O SOB

## 2019-03-06 LAB
CULTURE RESULTS: NO GROWTH — SIGNIFICANT CHANGE UP
SPECIMEN SOURCE: SIGNIFICANT CHANGE UP

## 2019-03-14 ENCOUNTER — CHART COPY (OUTPATIENT)
Age: 39
End: 2019-03-14

## 2019-03-14 RX ORDER — CYCLOBENZAPRINE HYDROCHLORIDE 5 MG/1
5 TABLET, FILM COATED ORAL
Qty: 60 | Refills: 1 | Status: DISCONTINUED | COMMUNITY
Start: 2018-11-23 | End: 2019-03-14

## 2019-03-14 RX ORDER — TROSPIUM CHLORIDE 20 MG/1
20 TABLET, FILM COATED ORAL
Qty: 60 | Refills: 1 | Status: DISCONTINUED | COMMUNITY
Start: 2018-11-23 | End: 2019-03-14

## 2019-03-19 ENCOUNTER — INPATIENT (INPATIENT)
Facility: HOSPITAL | Age: 39
LOS: 1 days | Discharge: HOME | End: 2019-03-21
Attending: OBSTETRICS & GYNECOLOGY | Admitting: OBSTETRICS & GYNECOLOGY

## 2019-03-19 VITALS
HEART RATE: 74 BPM | RESPIRATION RATE: 18 BRPM | WEIGHT: 162.04 LBS | SYSTOLIC BLOOD PRESSURE: 112 MMHG | DIASTOLIC BLOOD PRESSURE: 81 MMHG | TEMPERATURE: 98 F

## 2019-03-19 RX ORDER — MEPERIDINE HYDROCHLORIDE 50 MG/ML
12.5 INJECTION INTRAMUSCULAR; INTRAVENOUS; SUBCUTANEOUS ONCE
Qty: 0 | Refills: 0 | Status: DISCONTINUED | OUTPATIENT
Start: 2019-03-19 | End: 2019-03-19

## 2019-03-19 RX ORDER — BACLOFEN 100 %
1 POWDER (GRAM) MISCELLANEOUS
Qty: 60 | Refills: 1
Start: 2019-03-19

## 2019-03-19 RX ORDER — BACLOFEN 100 %
1 POWDER (GRAM) MISCELLANEOUS
Qty: 0 | Refills: 0 | COMMUNITY

## 2019-03-19 RX ORDER — DOCUSATE SODIUM 100 MG
100 CAPSULE ORAL ONCE
Qty: 0 | Refills: 0 | Status: DISCONTINUED | OUTPATIENT
Start: 2019-03-19 | End: 2019-03-20

## 2019-03-19 RX ORDER — OXYCODONE AND ACETAMINOPHEN 5; 325 MG/1; MG/1
2 TABLET ORAL EVERY 6 HOURS
Qty: 0 | Refills: 0 | Status: DISCONTINUED | OUTPATIENT
Start: 2019-03-19 | End: 2019-03-21

## 2019-03-19 RX ORDER — NITROFURANTOIN MACROCRYSTAL 50 MG
100 CAPSULE ORAL ONCE
Qty: 0 | Refills: 0 | Status: DISCONTINUED | OUTPATIENT
Start: 2019-03-19 | End: 2019-03-20

## 2019-03-19 RX ORDER — ENOXAPARIN SODIUM 100 MG/ML
40 INJECTION SUBCUTANEOUS AT BEDTIME
Qty: 0 | Refills: 0 | Status: DISCONTINUED | OUTPATIENT
Start: 2019-03-19 | End: 2019-03-21

## 2019-03-19 RX ORDER — ENOXAPARIN SODIUM 100 MG/ML
40 INJECTION SUBCUTANEOUS ONCE
Qty: 0 | Refills: 0 | Status: DISCONTINUED | OUTPATIENT
Start: 2019-03-19 | End: 2019-03-20

## 2019-03-19 RX ORDER — ACETAMINOPHEN 500 MG
650 TABLET ORAL EVERY 6 HOURS
Qty: 0 | Refills: 0 | Status: DISCONTINUED | OUTPATIENT
Start: 2019-03-19 | End: 2019-03-20

## 2019-03-19 RX ORDER — IBUPROFEN 200 MG
1 TABLET ORAL
Qty: 60 | Refills: 0
Start: 2019-03-19

## 2019-03-19 RX ORDER — CYCLOBENZAPRINE HYDROCHLORIDE 10 MG/1
1 TABLET, FILM COATED ORAL
Qty: 0 | Refills: 0 | COMMUNITY

## 2019-03-19 RX ORDER — NITROFURANTOIN MACROCRYSTAL 50 MG
1 CAPSULE ORAL
Qty: 10 | Refills: 0
Start: 2019-03-19

## 2019-03-19 RX ORDER — DOCUSATE SODIUM 100 MG
100 CAPSULE ORAL
Qty: 0 | Refills: 0 | Status: DISCONTINUED | OUTPATIENT
Start: 2019-03-19 | End: 2019-03-21

## 2019-03-19 RX ORDER — NITROFURANTOIN MACROCRYSTAL 50 MG
100 CAPSULE ORAL
Qty: 0 | Refills: 0 | Status: DISCONTINUED | OUTPATIENT
Start: 2019-03-19 | End: 2019-03-21

## 2019-03-19 RX ORDER — ONDANSETRON 8 MG/1
4 TABLET, FILM COATED ORAL ONCE
Qty: 0 | Refills: 0 | Status: DISCONTINUED | OUTPATIENT
Start: 2019-03-19 | End: 2019-03-20

## 2019-03-19 RX ORDER — ONDANSETRON 8 MG/1
1 TABLET, FILM COATED ORAL
Qty: 15 | Refills: 0 | OUTPATIENT
Start: 2019-03-19

## 2019-03-19 RX ORDER — PHENAZOPYRIDINE HCL 100 MG
200 TABLET ORAL ONCE
Qty: 0 | Refills: 0 | Status: COMPLETED | OUTPATIENT
Start: 2019-03-19 | End: 2019-03-19

## 2019-03-19 RX ORDER — MORPHINE SULFATE 50 MG/1
2 CAPSULE, EXTENDED RELEASE ORAL
Qty: 0 | Refills: 0 | Status: DISCONTINUED | OUTPATIENT
Start: 2019-03-19 | End: 2019-03-19

## 2019-03-19 RX ORDER — OXYCODONE AND ACETAMINOPHEN 5; 325 MG/1; MG/1
1 TABLET ORAL ONCE
Qty: 0 | Refills: 0 | Status: DISCONTINUED | OUTPATIENT
Start: 2019-03-19 | End: 2019-03-19

## 2019-03-19 RX ORDER — SODIUM CHLORIDE 9 MG/ML
1000 INJECTION, SOLUTION INTRAVENOUS
Qty: 0 | Refills: 0 | Status: DISCONTINUED | OUTPATIENT
Start: 2019-03-19 | End: 2019-03-19

## 2019-03-19 RX ORDER — PANTOPRAZOLE SODIUM 20 MG/1
40 TABLET, DELAYED RELEASE ORAL
Qty: 0 | Refills: 0 | Status: DISCONTINUED | OUTPATIENT
Start: 2019-03-19 | End: 2019-03-21

## 2019-03-19 RX ORDER — HYDROMORPHONE HYDROCHLORIDE 2 MG/ML
0.5 INJECTION INTRAMUSCULAR; INTRAVENOUS; SUBCUTANEOUS
Qty: 0 | Refills: 0 | Status: DISCONTINUED | OUTPATIENT
Start: 2019-03-19 | End: 2019-03-19

## 2019-03-19 RX ORDER — TROSPIUM CHLORIDE 20 MG/1
1 TABLET, FILM COATED ORAL
Qty: 0 | Refills: 0 | COMMUNITY

## 2019-03-19 RX ORDER — IBUPROFEN 200 MG
600 TABLET ORAL EVERY 6 HOURS
Qty: 0 | Refills: 0 | Status: DISCONTINUED | OUTPATIENT
Start: 2019-03-19 | End: 2019-03-21

## 2019-03-19 RX ORDER — ENOXAPARIN SODIUM 100 MG/ML
40 INJECTION SUBCUTANEOUS EVERY 24 HOURS
Qty: 0 | Refills: 0 | Status: DISCONTINUED | OUTPATIENT
Start: 2019-03-19 | End: 2019-03-19

## 2019-03-19 RX ORDER — OXYCODONE AND ACETAMINOPHEN 5; 325 MG/1; MG/1
1 TABLET ORAL
Qty: 20 | Refills: 0
Start: 2019-03-19

## 2019-03-19 RX ORDER — BACLOFEN 100 %
20 POWDER (GRAM) MISCELLANEOUS
Qty: 0 | Refills: 0 | Status: DISCONTINUED | OUTPATIENT
Start: 2019-03-19 | End: 2019-03-21

## 2019-03-19 RX ORDER — DOCUSATE SODIUM 100 MG
1 CAPSULE ORAL
Qty: 60 | Refills: 1
Start: 2019-03-19

## 2019-03-19 RX ORDER — OXYCODONE AND ACETAMINOPHEN 5; 325 MG/1; MG/1
1 TABLET ORAL EVERY 4 HOURS
Qty: 0 | Refills: 0 | Status: DISCONTINUED | OUTPATIENT
Start: 2019-03-19 | End: 2019-03-21

## 2019-03-19 RX ORDER — OXYCODONE HYDROCHLORIDE 5 MG/1
5 TABLET ORAL ONCE
Qty: 0 | Refills: 0 | Status: DISCONTINUED | OUTPATIENT
Start: 2019-03-19 | End: 2019-03-19

## 2019-03-19 RX ORDER — BACLOFEN 100 %
20 POWDER (GRAM) MISCELLANEOUS
Qty: 0 | Refills: 0 | Status: DISCONTINUED | OUTPATIENT
Start: 2019-03-19 | End: 2019-03-20

## 2019-03-19 RX ORDER — IBUPROFEN 200 MG
600 TABLET ORAL EVERY 6 HOURS
Qty: 0 | Refills: 0 | Status: DISCONTINUED | OUTPATIENT
Start: 2019-03-19 | End: 2019-03-20

## 2019-03-19 RX ORDER — OXYCODONE AND ACETAMINOPHEN 5; 325 MG/1; MG/1
2 TABLET ORAL ONCE
Qty: 0 | Refills: 0 | Status: DISCONTINUED | OUTPATIENT
Start: 2019-03-19 | End: 2019-03-19

## 2019-03-19 RX ORDER — ONDANSETRON 8 MG/1
4 TABLET, FILM COATED ORAL EVERY 4 HOURS
Qty: 0 | Refills: 0 | Status: DISCONTINUED | OUTPATIENT
Start: 2019-03-19 | End: 2019-03-21

## 2019-03-19 RX ORDER — OMEPRAZOLE 10 MG/1
1 CAPSULE, DELAYED RELEASE ORAL
Qty: 0 | Refills: 0 | COMMUNITY

## 2019-03-19 RX ORDER — SODIUM CHLORIDE 9 MG/ML
1000 INJECTION, SOLUTION INTRAVENOUS
Qty: 0 | Refills: 0 | Status: DISCONTINUED | OUTPATIENT
Start: 2019-03-19 | End: 2019-03-20

## 2019-03-19 RX ORDER — ONDANSETRON 8 MG/1
4 TABLET, FILM COATED ORAL ONCE
Qty: 0 | Refills: 0 | Status: COMPLETED | OUTPATIENT
Start: 2019-03-19 | End: 2019-03-19

## 2019-03-19 RX ORDER — SODIUM CHLORIDE 9 MG/ML
1000 INJECTION, SOLUTION INTRAVENOUS
Qty: 0 | Refills: 0 | Status: DISCONTINUED | OUTPATIENT
Start: 2019-03-19 | End: 2019-03-21

## 2019-03-19 RX ADMIN — Medication 600 MILLIGRAM(S): at 23:44

## 2019-03-19 RX ADMIN — HYDROMORPHONE HYDROCHLORIDE 0.5 MILLIGRAM(S): 2 INJECTION INTRAMUSCULAR; INTRAVENOUS; SUBCUTANEOUS at 12:17

## 2019-03-19 RX ADMIN — ENOXAPARIN SODIUM 40 MILLIGRAM(S): 100 INJECTION SUBCUTANEOUS at 21:14

## 2019-03-19 RX ADMIN — Medication 200 MILLIGRAM(S): at 07:18

## 2019-03-19 RX ADMIN — OXYCODONE AND ACETAMINOPHEN 1 TABLET(S): 5; 325 TABLET ORAL at 20:33

## 2019-03-19 RX ADMIN — Medication 600 MILLIGRAM(S): at 19:13

## 2019-03-19 RX ADMIN — HYDROMORPHONE HYDROCHLORIDE 0.5 MILLIGRAM(S): 2 INJECTION INTRAMUSCULAR; INTRAVENOUS; SUBCUTANEOUS at 10:34

## 2019-03-19 RX ADMIN — HYDROMORPHONE HYDROCHLORIDE 0.5 MILLIGRAM(S): 2 INJECTION INTRAMUSCULAR; INTRAVENOUS; SUBCUTANEOUS at 13:20

## 2019-03-19 RX ADMIN — HYDROMORPHONE HYDROCHLORIDE 0.5 MILLIGRAM(S): 2 INJECTION INTRAMUSCULAR; INTRAVENOUS; SUBCUTANEOUS at 12:30

## 2019-03-19 RX ADMIN — HYDROMORPHONE HYDROCHLORIDE 0.5 MILLIGRAM(S): 2 INJECTION INTRAMUSCULAR; INTRAVENOUS; SUBCUTANEOUS at 12:45

## 2019-03-19 RX ADMIN — OXYCODONE AND ACETAMINOPHEN 1 TABLET(S): 5; 325 TABLET ORAL at 21:30

## 2019-03-19 RX ADMIN — SODIUM CHLORIDE 100 MILLILITER(S): 9 INJECTION, SOLUTION INTRAVENOUS at 10:20

## 2019-03-19 RX ADMIN — HYDROMORPHONE HYDROCHLORIDE 0.5 MILLIGRAM(S): 2 INJECTION INTRAMUSCULAR; INTRAVENOUS; SUBCUTANEOUS at 11:46

## 2019-03-19 RX ADMIN — ONDANSETRON 4 MILLIGRAM(S): 8 TABLET, FILM COATED ORAL at 16:27

## 2019-03-19 RX ADMIN — HYDROMORPHONE HYDROCHLORIDE 0.5 MILLIGRAM(S): 2 INJECTION INTRAMUSCULAR; INTRAVENOUS; SUBCUTANEOUS at 10:59

## 2019-03-19 RX ADMIN — OXYCODONE AND ACETAMINOPHEN 1 TABLET(S): 5; 325 TABLET ORAL at 23:44

## 2019-03-19 RX ADMIN — OXYCODONE AND ACETAMINOPHEN 2 TABLET(S): 5; 325 TABLET ORAL at 19:08

## 2019-03-19 RX ADMIN — Medication 20 MILLIGRAM(S): at 20:30

## 2019-03-19 RX ADMIN — Medication 100 MILLIGRAM(S): at 19:08

## 2019-03-19 RX ADMIN — OXYCODONE HYDROCHLORIDE 5 MILLIGRAM(S): 5 TABLET ORAL at 17:20

## 2019-03-19 NOTE — PRE-ANESTHESIA EVALUATION ADULT - NSANTHOSAYNRD_GEN_A_CORE
No. CHARITY screening performed.  STOP BANG Legend: 0-2 = LOW Risk; 3-4 = INTERMEDIATE Risk; 5-8 = HIGH Risk

## 2019-03-19 NOTE — ASU DISCHARGE PLAN (ADULT/PEDIATRIC) - CARE PROVIDER_API CALL
Pamela Chahal)  Female Pelvic MedReconst Surg; Obstetrics and Gynecology  [address] [place] [address]  Phone: [phone]  Fax: [phone]  Follow Up Time:

## 2019-03-19 NOTE — BRIEF OPERATIVE NOTE - NSICDXBRIEFPROCEDURE_GEN_ALL_CORE_FT
PROCEDURES:  Colporrhaphy, posterior, for rectocele repair, with perineorrhaphy if indicated 19-Mar-2019 10:18:47  Lina Flores  Creation, sling, mid-urethral, female 19-Mar-2019 10:17:31  Lina Flores

## 2019-03-19 NOTE — BRIEF OPERATIVE NOTE - NSICDXBRIEFPOSTOP_GEN_ALL_CORE_FT
POST-OP DIAGNOSIS:  Rectocele 19-Mar-2019 10:19:43  Lina Flores  Mixed incontinence 19-Mar-2019 10:19:35  Lina Flores

## 2019-03-19 NOTE — CHART NOTE - NSCHARTNOTEFT_GEN_A_CORE
Anesthesia Post Op Assessment  		(    ) Intubated           TV _____	Rate _sv____	FiO2__4LNC___  		(  x  ) Patent airway. Full return of protective reflexes  		(  x  )Full recovery from anesthesia/sedation to baseline status      Cardiovascular Function:  		BP:	114/65	                  Pulse:		62                  RR:		12                  Temp:		97.4                  O2Sat:           100      Mental Status:  	        (  x  ) awake		  (   x ) alert		 (    ) drowsy	               (    ) sedated      Nausea/Vomiting:  		(    ) Yes, See post-op orders		   (  x  ) No      Pain Scale: (0-10):			Treatment:     (    ) None	            (  x  ) See Post-Op/PCA Orders      Post-operative Fluids: 	   (    ) Oral	          ( x   ) See post-op Orders        Comments:    Uneventful. No complications from anesthesia.  Discharge when criteria met.

## 2019-03-19 NOTE — ASU DISCHARGE PLAN (ADULT/PEDIATRIC) - ASU DC SPECIAL INSTRUCTIONSFT
Please stop taking the Vesicare.   We sent you refills for the muscle relaxant (Baclofen 20mg twice a day), continue taking it.   Take Macrobid 100mg twice a day for 5days after surgery (antibiotic).  Take colace 100mg twice a day for 6weeks to help you not get constipated, if still having hard stool/straining please call the office so we can prescribe you something stronger.   For pain you may take (sent to your pharmacy)  - Ibuprofen 600mg every 6hours as needed if mild-moderate pain  - Percocet 5/325mg every 4hours as needed if stronger pain     If fever >100.4F, inability to vois, severe abdominal pain or excessive bleeding (soaking through two thick full-size sanitary pads per hour for two consecutive hours), call your doctor and/or go to the Emergency Department. Please stop taking the Vesicare.   We sent you refills for the muscle relaxant (Baclofen 20mg twice a day), continue taking it.   Take Macrobid 100mg twice a day for 5days after surgery (antibiotic).  Take colace 100mg twice a day for 6weeks to help you not get constipated, if still having hard stool/straining please call the office so we can prescribe you something stronger.   For pain you may take (sent to your pharmacy)  - Ibuprofen 600mg every 6hours as needed if mild-moderate pain  - Percocet 5/325mg every 4hours as needed if stronger pain     If fever >100.4F, inability to void, severe abdominal pain or excessive bleeding (soaking through two thick full-size sanitary pads per hour for two consecutive hours), call your doctor and/or go to the Emergency Department.

## 2019-03-19 NOTE — PROGRESS NOTE ADULT - ASSESSMENT
39y no PMHx s/p midurethral sling posterior colporrhaphy perineorrhaphy for mixed UI and rectocele. EBL 100cc. POD 0, will stay overnight for pain control post-op    - keep sánchez in, will attemp TOV in the morning before discharge  - f/u AM labs  - PO pain meds  - Regular diet  - ambulate, SCDs while in bed  - Lovenox starting tonight    Dr Chahal aware

## 2019-03-19 NOTE — PRE-ANESTHESIA EVALUATION ADULT - NSPROPOSEDPROCEDFT_GEN_ALL_CORE
cysto [employer] retropubic midurethral sling, cystoscopy, posterior colporrhaphy, possible perrineorrhapy, all indicated procedures

## 2019-03-19 NOTE — BRIEF OPERATIVE NOTE - NSICDXBRIEFPREOP_GEN_ALL_CORE_FT
PRE-OP DIAGNOSIS:  Rectocele 19-Mar-2019 10:19:21  Lina Flores  Mixed incontinence 19-Mar-2019 10:19:13  Lina Flores

## 2019-03-19 NOTE — ASU DISCHARGE PLAN (ADULT/PEDIATRIC) - ACTIVITY LEVEL
No excercise/No weight bearing/No intercourse/No douching/No heavy lifting/No sports/gym/Nothing per rectum/Nothing per vagina/No tub baths/No tampons

## 2019-03-19 NOTE — ASU DISCHARGE PLAN (ADULT/PEDIATRIC) - CALL YOUR DOCTOR IF YOU HAVE ANY OF THE FOLLOWING:
Bleeding that does not stop/Unable to urinate/Fever greater than (need to indicate Fahrenheit or Celsius)/Pain not relieved by Medications

## 2019-03-20 ENCOUNTER — TRANSCRIPTION ENCOUNTER (OUTPATIENT)
Age: 39
End: 2019-03-20

## 2019-03-20 LAB
ALBUMIN SERPL ELPH-MCNC: 3.3 G/DL — LOW (ref 3.5–5.2)
ALP SERPL-CCNC: 63 U/L — SIGNIFICANT CHANGE UP (ref 30–115)
ALT FLD-CCNC: 109 U/L — HIGH (ref 0–41)
ANION GAP SERPL CALC-SCNC: 11 MMOL/L — SIGNIFICANT CHANGE UP (ref 7–14)
AST SERPL-CCNC: 47 U/L — HIGH (ref 0–41)
BASOPHILS # BLD AUTO: 0.03 K/UL — SIGNIFICANT CHANGE UP (ref 0–0.2)
BASOPHILS # BLD AUTO: 0.03 K/UL — SIGNIFICANT CHANGE UP (ref 0–0.2)
BASOPHILS NFR BLD AUTO: 0.2 % — SIGNIFICANT CHANGE UP (ref 0–1)
BASOPHILS NFR BLD AUTO: 0.3 % — SIGNIFICANT CHANGE UP (ref 0–1)
BILIRUB SERPL-MCNC: 0.3 MG/DL — SIGNIFICANT CHANGE UP (ref 0.2–1.2)
BUN SERPL-MCNC: 10 MG/DL — SIGNIFICANT CHANGE UP (ref 10–20)
CALCIUM SERPL-MCNC: 8.4 MG/DL — LOW (ref 8.5–10.1)
CHLORIDE SERPL-SCNC: 104 MMOL/L — SIGNIFICANT CHANGE UP (ref 98–110)
CO2 SERPL-SCNC: 24 MMOL/L — SIGNIFICANT CHANGE UP (ref 17–32)
CREAT SERPL-MCNC: 0.6 MG/DL — LOW (ref 0.7–1.5)
CULTURE RESULTS: NO GROWTH — SIGNIFICANT CHANGE UP
EOSINOPHIL # BLD AUTO: 0.05 K/UL — SIGNIFICANT CHANGE UP (ref 0–0.7)
EOSINOPHIL # BLD AUTO: 0.13 K/UL — SIGNIFICANT CHANGE UP (ref 0–0.7)
EOSINOPHIL NFR BLD AUTO: 0.4 % — SIGNIFICANT CHANGE UP (ref 0–8)
EOSINOPHIL NFR BLD AUTO: 1.1 % — SIGNIFICANT CHANGE UP (ref 0–8)
FLU A RESULT: NEGATIVE — SIGNIFICANT CHANGE UP
FLU A RESULT: NEGATIVE — SIGNIFICANT CHANGE UP
FLUAV AG NPH QL: NEGATIVE — SIGNIFICANT CHANGE UP
FLUBV AG NPH QL: NEGATIVE — SIGNIFICANT CHANGE UP
GLUCOSE SERPL-MCNC: 123 MG/DL — HIGH (ref 70–99)
HCT VFR BLD CALC: 31.2 % — LOW (ref 37–47)
HCT VFR BLD CALC: 32.1 % — LOW (ref 37–47)
HGB BLD-MCNC: 10.5 G/DL — LOW (ref 12–16)
HGB BLD-MCNC: 10.7 G/DL — LOW (ref 12–16)
IMM GRANULOCYTES NFR BLD AUTO: 0.4 % — HIGH (ref 0.1–0.3)
IMM GRANULOCYTES NFR BLD AUTO: 0.4 % — HIGH (ref 0.1–0.3)
LYMPHOCYTES # BLD AUTO: 2.71 K/UL — SIGNIFICANT CHANGE UP (ref 1.2–3.4)
LYMPHOCYTES # BLD AUTO: 2.88 K/UL — SIGNIFICANT CHANGE UP (ref 1.2–3.4)
LYMPHOCYTES # BLD AUTO: 21.7 % — SIGNIFICANT CHANGE UP (ref 20.5–51.1)
LYMPHOCYTES # BLD AUTO: 25.3 % — SIGNIFICANT CHANGE UP (ref 20.5–51.1)
MAGNESIUM SERPL-MCNC: 1.8 MG/DL — SIGNIFICANT CHANGE UP (ref 1.8–2.4)
MCHC RBC-ENTMCNC: 30.1 PG — SIGNIFICANT CHANGE UP (ref 27–31)
MCHC RBC-ENTMCNC: 30.1 PG — SIGNIFICANT CHANGE UP (ref 27–31)
MCHC RBC-ENTMCNC: 33.3 G/DL — SIGNIFICANT CHANGE UP (ref 32–37)
MCHC RBC-ENTMCNC: 33.7 G/DL — SIGNIFICANT CHANGE UP (ref 32–37)
MCV RBC AUTO: 89.4 FL — SIGNIFICANT CHANGE UP (ref 81–99)
MCV RBC AUTO: 90.4 FL — SIGNIFICANT CHANGE UP (ref 81–99)
MONOCYTES # BLD AUTO: 1.1 K/UL — HIGH (ref 0.1–0.6)
MONOCYTES # BLD AUTO: 1.27 K/UL — HIGH (ref 0.1–0.6)
MONOCYTES NFR BLD AUTO: 10.2 % — HIGH (ref 1.7–9.3)
MONOCYTES NFR BLD AUTO: 9.7 % — HIGH (ref 1.7–9.3)
NEUTROPHILS # BLD AUTO: 7.19 K/UL — HIGH (ref 1.4–6.5)
NEUTROPHILS # BLD AUTO: 8.36 K/UL — HIGH (ref 1.4–6.5)
NEUTROPHILS NFR BLD AUTO: 63.2 % — SIGNIFICANT CHANGE UP (ref 42.2–75.2)
NEUTROPHILS NFR BLD AUTO: 67.1 % — SIGNIFICANT CHANGE UP (ref 42.2–75.2)
NRBC # BLD: 0 /100 WBCS — SIGNIFICANT CHANGE UP (ref 0–0)
NRBC # BLD: 0 /100 WBCS — SIGNIFICANT CHANGE UP (ref 0–0)
PHOSPHATE SERPL-MCNC: 3.7 MG/DL — SIGNIFICANT CHANGE UP (ref 2.1–4.9)
PLATELET # BLD AUTO: 269 K/UL — SIGNIFICANT CHANGE UP (ref 130–400)
PLATELET # BLD AUTO: 285 K/UL — SIGNIFICANT CHANGE UP (ref 130–400)
POTASSIUM SERPL-MCNC: 4.1 MMOL/L — SIGNIFICANT CHANGE UP (ref 3.5–5)
POTASSIUM SERPL-SCNC: 4.1 MMOL/L — SIGNIFICANT CHANGE UP (ref 3.5–5)
PROT SERPL-MCNC: 5.6 G/DL — LOW (ref 6–8)
RBC # BLD: 3.49 M/UL — LOW (ref 4.2–5.4)
RBC # BLD: 3.55 M/UL — LOW (ref 4.2–5.4)
RBC # FLD: 13.9 % — SIGNIFICANT CHANGE UP (ref 11.5–14.5)
RBC # FLD: 14.1 % — SIGNIFICANT CHANGE UP (ref 11.5–14.5)
RSV RESULT: NEGATIVE — SIGNIFICANT CHANGE UP
RSV RNA RESP QL NAA+PROBE: NEGATIVE — SIGNIFICANT CHANGE UP
SODIUM SERPL-SCNC: 139 MMOL/L — SIGNIFICANT CHANGE UP (ref 135–146)
SPECIMEN SOURCE: SIGNIFICANT CHANGE UP
WBC # BLD: 11.38 K/UL — HIGH (ref 4.8–10.8)
WBC # BLD: 12.47 K/UL — HIGH (ref 4.8–10.8)
WBC # FLD AUTO: 11.38 K/UL — HIGH (ref 4.8–10.8)
WBC # FLD AUTO: 12.47 K/UL — HIGH (ref 4.8–10.8)

## 2019-03-20 RX ORDER — ACETAMINOPHEN 500 MG
2 TABLET ORAL
Qty: 0 | Refills: 0 | COMMUNITY
Start: 2019-03-20

## 2019-03-20 RX ORDER — MAGNESIUM HYDROXIDE 400 MG/1
30 TABLET, CHEWABLE ORAL ONCE
Qty: 0 | Refills: 0 | Status: COMPLETED | OUTPATIENT
Start: 2019-03-20 | End: 2019-03-20

## 2019-03-20 RX ORDER — ACETAMINOPHEN 500 MG
650 TABLET ORAL EVERY 6 HOURS
Qty: 0 | Refills: 0 | Status: DISCONTINUED | OUTPATIENT
Start: 2019-03-20 | End: 2019-03-21

## 2019-03-20 RX ADMIN — Medication 100 MILLIGRAM(S): at 05:12

## 2019-03-20 RX ADMIN — ONDANSETRON 4 MILLIGRAM(S): 8 TABLET, FILM COATED ORAL at 09:00

## 2019-03-20 RX ADMIN — OXYCODONE AND ACETAMINOPHEN 1 TABLET(S): 5; 325 TABLET ORAL at 16:10

## 2019-03-20 RX ADMIN — PANTOPRAZOLE SODIUM 40 MILLIGRAM(S): 20 TABLET, DELAYED RELEASE ORAL at 08:06

## 2019-03-20 RX ADMIN — Medication 600 MILLIGRAM(S): at 12:02

## 2019-03-20 RX ADMIN — MAGNESIUM HYDROXIDE 30 MILLILITER(S): 400 TABLET, CHEWABLE ORAL at 17:24

## 2019-03-20 RX ADMIN — Medication 100 MILLIGRAM(S): at 08:06

## 2019-03-20 RX ADMIN — Medication 650 MILLIGRAM(S): at 17:24

## 2019-03-20 RX ADMIN — Medication 650 MILLIGRAM(S): at 11:37

## 2019-03-20 RX ADMIN — Medication 600 MILLIGRAM(S): at 11:37

## 2019-03-20 RX ADMIN — Medication 600 MILLIGRAM(S): at 23:53

## 2019-03-20 RX ADMIN — OXYCODONE AND ACETAMINOPHEN 1 TABLET(S): 5; 325 TABLET ORAL at 00:45

## 2019-03-20 RX ADMIN — Medication 650 MILLIGRAM(S): at 12:02

## 2019-03-20 RX ADMIN — Medication 650 MILLIGRAM(S): at 23:53

## 2019-03-20 RX ADMIN — Medication 600 MILLIGRAM(S): at 17:26

## 2019-03-20 RX ADMIN — Medication 20 MILLIGRAM(S): at 05:12

## 2019-03-20 RX ADMIN — ENOXAPARIN SODIUM 40 MILLIGRAM(S): 100 INJECTION SUBCUTANEOUS at 21:13

## 2019-03-20 RX ADMIN — OXYCODONE AND ACETAMINOPHEN 1 TABLET(S): 5; 325 TABLET ORAL at 21:16

## 2019-03-20 RX ADMIN — Medication 600 MILLIGRAM(S): at 06:00

## 2019-03-20 RX ADMIN — OXYCODONE AND ACETAMINOPHEN 2 TABLET(S): 5; 325 TABLET ORAL at 05:12

## 2019-03-20 RX ADMIN — Medication 600 MILLIGRAM(S): at 05:12

## 2019-03-20 RX ADMIN — Medication 20 MILLIGRAM(S): at 17:24

## 2019-03-20 RX ADMIN — ONDANSETRON 4 MILLIGRAM(S): 8 TABLET, FILM COATED ORAL at 12:33

## 2019-03-20 RX ADMIN — Medication 100 MILLIGRAM(S): at 17:24

## 2019-03-20 RX ADMIN — OXYCODONE AND ACETAMINOPHEN 1 TABLET(S): 5; 325 TABLET ORAL at 22:16

## 2019-03-20 RX ADMIN — OXYCODONE AND ACETAMINOPHEN 2 TABLET(S): 5; 325 TABLET ORAL at 06:12

## 2019-03-20 NOTE — PROGRESS NOTE ADULT - ASSESSMENT
39y no PMHx s/p midurethral sling posterior colporrhaphy perineorrhaphy for mixed UI and rectocele. EBL 100cc. POD 1, doing well    - will attempt active voiding trial this morning.   - f/u AM labs  - PO pain meds  - Regular diet  - ambulate, SCDs while in bed, lovenox   - anticipate d/c home today    Dr Chahal to be made aware 39y no PMHx s/p midurethral sling posterior colporrhaphy perineorrhaphy for mixed UI and rectocele. EBL 100cc. POD 1, doing well    - will attemot active voiding trial this morning   - f/u AM labs  - PO pain meds  - Regular diet  - ambulate, SCDs while in bed, lovenox   - anticipate d/c home today    Dr Chahal to be made aware

## 2019-03-20 NOTE — DISCHARGE NOTE PROVIDER - HOSPITAL COURSE
39y no significant PMHx s/p midurethral sling posterior colporrhaphy perineorrhaphy on 3/19/19 for mixed UI and rectocele, uncomplicated procedure. Patient felt weak and not ready to go home after surgery. She was kept pvernight for observation and pain management. XXXXXXXXXXXXXXXXX 39y no significant PMHx s/p midurethral sling posterior colporrhaphy perineorrhaphy on 3/19/19 for mixed UI and rectocele, uncomplicated procedure. Patient felt weak/dizzy and not ready to go home after surgery. She slowly improved her dizziness and remained stable post-operatively. Passed voiding trial on POD 2 and was discharged home ambulating, voiding well, tolerating regular diet, passing flatus, good pain control on PO meds. To f/u in clinic with Dr Chahal.

## 2019-03-20 NOTE — DISCHARGE NOTE PROVIDER - NSDCCPTREATMENT_GEN_ALL_CORE_FT
PRINCIPAL PROCEDURE  Procedure: Creation, sling, mid-urethral, female  Findings and Treatment:       SECONDARY PROCEDURE  Procedure: Colporrhaphy, posterior, for rectocele repair, with perineorrhaphy if indicated  Findings and Treatment:

## 2019-03-20 NOTE — DISCHARGE NOTE PROVIDER - NSDCFUADDAPPT_GEN_ALL_CORE_FT
f/u appointments in 2 and 6weeks post-operative. The office staff will call you to confirm date and time of appointments.

## 2019-03-20 NOTE — PROGRESS NOTE ADULT - ATTENDING COMMENTS
I examined the patient myself.  Vitals stable, not orthostatic  Urine output adequate  Hemoglobin stable  Tolerating PO  Extreme dizziness and nausea, particularly with standing, improving throughout the day  Clinically stable   Will continue to monitor

## 2019-03-20 NOTE — DISCHARGE NOTE PROVIDER - CARE PROVIDER_API CALL
Pamela Chahal)  Female Pelvic MedReconst Surg; Obstetrics and Gynecology  [address] [place] [address]  Phone: [phone]  Fax: [phone]  Follow Up Time:

## 2019-03-20 NOTE — DISCHARGE NOTE PROVIDER - NSDCCPCAREPLAN_GEN_ALL_CORE_FT
PRINCIPAL DISCHARGE DIAGNOSIS  Diagnosis: Mixed incontinence  Assessment and Plan of Treatment:       SECONDARY DISCHARGE DIAGNOSES  Diagnosis: Rectocele  Assessment and Plan of Treatment:

## 2019-03-20 NOTE — DISCHARGE NOTE PROVIDER - NSDCFUADDINST_GEN_ALL_CORE_FT
Please stop taking the Vesicare.   We sent you refills for the muscle relaxant (Baclofen 20mg twice a day), continue taking it.   Take Macrobid 100mg twice a day for 5days after surgery (antibiotic).  Take colace 100mg twice a day for 6weeks to help you not get constipated, if still having hard stool/straining please call the office so we can prescribe you something stronger.     For pain you may take (sent to your pharmacy)  - Ibuprofen 600mg every 6hours as needed if mild-moderate pain  - Percocet 5/325mg every 4hours as needed if stronger pain     If fever >100.4F, inability to void, severe abdominal pain or excessive bleeding (soaking through two thick full-size sanitary pads per hour for two consecutive hours), call your doctor and/or go to the Emergency Department.

## 2019-03-21 ENCOUNTER — TRANSCRIPTION ENCOUNTER (OUTPATIENT)
Age: 39
End: 2019-03-21

## 2019-03-21 VITALS
HEART RATE: 82 BPM | SYSTOLIC BLOOD PRESSURE: 107 MMHG | TEMPERATURE: 97 F | DIASTOLIC BLOOD PRESSURE: 65 MMHG | RESPIRATION RATE: 16 BRPM | OXYGEN SATURATION: 97 %

## 2019-03-21 LAB
HAV IGM SER-ACNC: SIGNIFICANT CHANGE UP
HBV CORE IGM SER-ACNC: SIGNIFICANT CHANGE UP
HBV SURFACE AG SER-ACNC: SIGNIFICANT CHANGE UP
HCV AB S/CO SERPL IA: 0.11 S/CO — SIGNIFICANT CHANGE UP (ref 0–0.79)
HCV AB SERPL-IMP: SIGNIFICANT CHANGE UP

## 2019-03-21 RX ADMIN — OXYCODONE AND ACETAMINOPHEN 1 TABLET(S): 5; 325 TABLET ORAL at 08:44

## 2019-03-21 RX ADMIN — Medication 650 MILLIGRAM(S): at 11:13

## 2019-03-21 RX ADMIN — Medication 650 MILLIGRAM(S): at 12:00

## 2019-03-21 RX ADMIN — Medication 100 MILLIGRAM(S): at 08:43

## 2019-03-21 RX ADMIN — Medication 600 MILLIGRAM(S): at 11:14

## 2019-03-21 RX ADMIN — Medication 600 MILLIGRAM(S): at 05:38

## 2019-03-21 RX ADMIN — Medication 650 MILLIGRAM(S): at 05:37

## 2019-03-21 RX ADMIN — ONDANSETRON 4 MILLIGRAM(S): 8 TABLET, FILM COATED ORAL at 08:46

## 2019-03-21 RX ADMIN — PANTOPRAZOLE SODIUM 40 MILLIGRAM(S): 20 TABLET, DELAYED RELEASE ORAL at 05:38

## 2019-03-21 RX ADMIN — Medication 20 MILLIGRAM(S): at 05:38

## 2019-03-21 RX ADMIN — Medication 100 MILLIGRAM(S): at 05:38

## 2019-03-21 NOTE — DISCHARGE NOTE NURSING/CASE MANAGEMENT/SOCIAL WORK - NSDCDPATPORTLINK_GEN_ALL_CORE
You can access the [facility] Patient Portal, offered by [facility], by registering with the following website: [email or web address]

## 2019-03-21 NOTE — PROGRESS NOTE ADULT - ASSESSMENT
39y no PMHx s/p midurethral sling posterior colporrhaphy perineorrhaphy for mixed UI and rectocele. EBL 100cc. POD 2, doing well    - Sanchez removed at 6am s/p successful active voiding trial  - PO pain meds  - Regular diet  - ambulate, SCDs while in bed, lovenox   - d/c home    Dr Khan and Dr Chahal to be made aware

## 2019-03-21 NOTE — PROVIDER CONTACT NOTE (OTHER) - SITUATION
pt being d/c'd today. pt has been feeling tired, dizzy, and nauseous. easily fatigued when ambulating.

## 2019-03-21 NOTE — PROGRESS NOTE ADULT - SUBJECTIVE AND OBJECTIVE BOX
DATE OF PROCEDURE: 03/19/2019  SURGEON: Pamela Chahal MD   ASSISTANT: Dr Cristel Flores PGY 4    PREOPERATIVE DIAGNOSES:  1. Rectocele  2. Mixed urinary incontinence    POSTOPERATIVE DIAGNOSES:   1. Rectocele  2. Mixed urinary incontinence    PROCEDURES PERFORMED:  1. TVT (retropubic [employer])  2. Cystourethroscopy.   3. Posterior colporrhaphy  4. Perinneorhaphy    COMPLICATIONS: None.   DISPOSITION: Stable to PACU.   DRAINS: Sanchez catheter.   ANESTHESIA: General via an endotracheal tube and 0.5% marcaine and 30cc of normal saline (60cc total) and 30cc pitressin solution (20units in 80cc of normal saline)  INTRAVENOUS FLUIDS: 1000 mL crystalloid.   ESTIMATED BLOOD LOSS: 100 mL.   URINE OUTPUT: 275 mL.   SPECIMENS: urine culture  FINDINGS: rectocele. The vaginal vault, as well as the rectal vault were examined at the end of the case and noted to be free of mesh exposure, perforation, or foreign body.   INDICATION: The patient is a 39-year-old woman who presented with pelvic organ prolapse that had become increasingly bothersome and impacting her quality of life. She was noted to have a symptomatic rectocele. There was evidence of preoperative stress urinary incontinence. After a thorough discussion of her options, both conservative and surgical, the patient elected to proceed with surgery. Informed consent  was obtained for the above-stated procedures. All of her questions were answered to her satisfaction. The patient expressed understanding and agreement to surgical plan. The rationale usage of synthetic polypropylene type 1 macroporous mesh was discussed and the safety profile when used in the setting of a midurethral sling was explained to her understanding. The risks and benefits were discussed. The patient was counseled that the gold standard surgical procedure for stress incontinence is a midurethral sling. The patient agreed to proceed.     DESCRIPTION OF PROCEDURE: The patient was brought to the operating room where she was placed in the supine position. Sequential compression devices were placed on both lower extremities for DVT prophylaxis. The patient was repositioned in lithotomy using hydraulic Sen stirrups. General  anesthesia was then administered via an endotracheal tube and found to be adequate. The patient received antibiotics for an infectious prophylaxis. She was then examined, prepped and draped in the usual sterile fashion. A formal timeout was performed with all surgical team members present and in agreement with the surgical plan.     A Sanchez catheter was inserted under sterile technique to drain the bladder. A sterile sample of urine was sent for culture since the patient reported today of having 3 days of dysuria. Her preop urine culture was negative for infection.  I then turned my attention to performing the retropubic midurethral sling. Suprapubic incision sites were marked within 2.5 cm of the midline along the surface of the pubic bone. The vaginal epithelium under the mid urethra was grasped with Allis clamps. All of these sites were injected with 0.5% Marcaine with normal saline solution. A vertical incision was then created with a scalpel between the 2 Allis clamps and Metzenbaum scissors were used to dissect the underlying fibromuscular connective tissue away from the vaginal epithelium to create tunnels for passage of the sling. A rigid catheter guide was used to deflect the urethra away from the patient's left side. A left TVT trocar was placed in the vaginal epithelium in the dissected tunnel and guided to enter the retropubic space at the junction of the descending pubic ramus  and the pubic bone. It was then continued anteriorly where it pierced the abdominal skin immediately over the cephalad edge of the pubic bone. Identical procedure was performed on the opposite side with the right TVT trocar. Cystoscopy was then performed that showed that there was no foreign body in the bladder or the urethral lumen. The urethra and bladder wall appeared normal. The urethra was evaluated under distention as the cystoscope was removed and there was no evidence of any perforation,  foreign body or abnormality. A new Sanchez catheter was placed to drain the bladder. Using a #10 Renetta  dilator, I adjusted the sling so that it was placed under no tension. A blue plastic tag at the mid-sling level was then transected and the excess plastic was removed from above with a Renetta dilator in place. The excess mesh arms were then trimmed below the level of the skin at the suprapubic sites. At this point, the Renetta dilator was removed. I closed the abdominal incisions with surgical glue and the vaginal incision was closed with a 2-0 Vicryl in a running fashion.     I turned my attention to performing a posterior colporrhaphy and perineorrhaphy. Allis clamps were placed on the left and right hymenal remnants and at the proximal limit of the rectovaginal septal defect along the vaginal epithelium. These areas were identified. The perineum and the vaginal epithelium were then injected with Pitressin solution, which was distributed beneath the perineal skin and the vaginal epithelium both in the midline and in the direction of the fornices.  The perineal skin and the vaginal epithelium within the outlined area was then dissected off the underlying perineal body and fibromuscular tissue with Swan scissors. The rectovaginal fibromuscular tissue was exposed bilaterally. These were plicated in the midline with a series of vertical mattress stitches of 2- 0 PDS. These were switched to 0-vicryl as I approached the introitus, bringing together the perineal body in the midline between the  hymenal remnants. A series of vertical mattress stitches were used to plicate the perineal body beneath the perineal skin; thereby, plicating the superficial and deep transverse perineal muscles and bulbar cavernosus muscle. Care was taken to make sure that the vaginal caliber and introitus allowed 2 to 3 fingerbreadths to be placed without difficulty. There was no significant posterior vaginal wall contracture or ridging at the completion of the plication.  The vaginal epithelial margins of the vertical incisions were then reapproximated with a running stitch of 2-0 Vicryl. Hemostasis was observed along the suture line. Vaginal exam confirmed good reinforcement of the rectocele site without ridging or significant contraction of the wall. Repeat rectal exam revealed no foreign body in the rectal vault.     Ray-Brian, needle and instrument counts were noted to be correct x 2. The patient tolerated the procedure well. Vaginal exam revealed no evidence of any mesh exposure or ridging inside the vagina. A rectal exam revealed no foreign body in the rectal vault. The patient was then awoken, extubated and transferred to the PACU in stable condition.
PGY4 GYN Progress Note    POD #0  Procedure:  Retropubic mid-urethral sling, perineorrhaphy, posterior colporrhaphy     Subjective: Patient reports lower abdominal pain 8/10. Patient feels weak to ambulate. She failed active voiding trial, sánchez draining orange urine with good UO. She is tolerating liquids, did not try to eat yet. She reports feeling to weak to go home and requests to stay overnight.      Physical exam:    Vital Signs Last 24 Hrs  T(C): 36.7 (19 Mar 2019 15:30), Max: 36.9 (19 Mar 2019 06:56)  T(F): 98.1 (19 Mar 2019 15:30), Max: 98.4 (19 Mar 2019 06:56)  HR: 93 (19 Mar 2019 16:20) (58 - 102)  BP: 108/71 (19 Mar 2019 16:20) (101/71 - 134/86)  RR: 16 (19 Mar 2019 16:20) (10 - 21)  SpO2: 96% (19 Mar 2019 15:30) (92% - 100%)      03-19-19 @ 07:01  -  03-19-19 @ 18:07  --------------------------------------------------------  IN: 1300 mL / OUT: 355 mL / NET: 945 mL    UO 355cc from 10h-1530.     Gen: NAD, alert and oriented  CVS: RRR s1/s2 no murmurs  Lungs: CTABL  Abdomen: BS+, soft, mildly tender in the lower abdomen, not distended, no R/G/R  Pelvic: deferred, minimal blood in the pad  Ext: No calf tenderness or edema    Diet: Regular    Meds:   HYDROmorphone  Injectable   0.5 milliGRAM(s) IV Push (03-19-19 @ 13:20)   0.5 milliGRAM(s) IV Push (03-19-19 @ 12:30)   0.5 milliGRAM(s) IV Push (03-19-19 @ 11:46)   0.5 milliGRAM(s) IV Push (03-19-19 @ 10:34)    lactated ringers.   100 mL/Hr IV Continuous (03-19-19 @ 10:15)    ondansetron Injectable   4 milliGRAM(s) IV Push (03-19-19 @ 16:27)    oxyCODONE    IR   5 milliGRAM(s) Oral (03-19-19 @ 17:20)    phenazopyridine   200 milliGRAM(s) Oral (03-19-19 @ 07:18)        LABS:    Preop: 3/5/19 6.78>13.1/40.1<324 11/0.96/33.3 141/4.1/107/24/11/0.6<84 Ucx NG                Allergies    No Known Allergies    Intolerances
PGY4 GYN Progress Note    POD #1  Procedure:  midurethral sling posterior colporrhaphy perineorrhaphy for mixed UI and rectocele. EBL 100cc.    Subjective: Patient is doing well. Pain well controlled with PO meds. Patient is not ambulating yet. Sanchez draining urine (~40cc/h overnight). She is tolerating regular diet. Passing flatus, feels like she wants to have a bowel movement but afraid to strain. Reports sore throat and feeling some phlegm in her chest. No chest pain SOB dizziness lightheadedness.     Physical exam:    Vital Signs Last 24 Hrs  T(C): 35.8 (20 Mar 2019 03:30), Max: 36.9 (19 Mar 2019 06:56)  T(F): 96.5 (20 Mar 2019 03:30), Max: 98.4 (19 Mar 2019 06:56)  HR: 94 (20 Mar 2019 03:30) (58 - 116)  BP: 103/54 (20 Mar 2019 03:30) (100/53 - 134/86)  RR: 18 (20 Mar 2019 03:30) (10 - 21)  SpO2: 96% (19 Mar 2019 15:30) (92% - 100%)      03-19-19 @ 07:01  -  03-20-19 @ 06:33  --------------------------------------------------------  IN: 1300 mL / OUT: 2655 mL / NET: -1355 mL    UO 400cc from 6582-8423    Gen: NAD, alert and oriented  CVS: RRR s1/s2 no murmurs  Lungs: CTABL  Abdomen: BS+, soft, mildly tender in the lower abdomen, not distended, sling incisions C/D/I  Pelvic: deferred, minimal blood in pad  Ext: No calf tenderness or edema    Diet: Regular    Meds:   baclofen   20 milliGRAM(s) Oral (03-20-19 @ 05:12)   20 milliGRAM(s) Oral (03-19-19 @ 20:30)    docusate sodium   100 milliGRAM(s) Oral (03-20-19 @ 05:12)   100 milliGRAM(s) Oral (03-19-19 @ 19:08)    enoxaparin Injectable   40 milliGRAM(s) SubCutaneous (03-19-19 @ 21:14)    HYDROmorphone  Injectable   0.5 milliGRAM(s) IV Push (03-19-19 @ 13:20)   0.5 milliGRAM(s) IV Push (03-19-19 @ 12:30)   0.5 milliGRAM(s) IV Push (03-19-19 @ 11:46)   0.5 milliGRAM(s) IV Push (03-19-19 @ 10:34)    ibuprofen  Tablet.   600 milliGRAM(s) Oral (03-20-19 @ 05:12)   600 milliGRAM(s) Oral (03-19-19 @ 23:44)   600 milliGRAM(s) Oral (03-19-19 @ 19:13)    lactated ringers.   100 mL/Hr IV Continuous (03-19-19 @ 10:15)    ondansetron Injectable   4 milliGRAM(s) IV Push (03-19-19 @ 16:27)    oxyCODONE    5 mG/acetaminophen 325 mG   1 Tablet(s) Oral (03-19-19 @ 23:44)   1 Tablet(s) Oral (03-19-19 @ 20:33)    oxyCODONE    5 mG/acetaminophen 325 mG   2 Tablet(s) Oral (03-20-19 @ 05:12)   2 Tablet(s) Oral (03-19-19 @ 19:08)    oxyCODONE    IR   5 milliGRAM(s) Oral (03-19-19 @ 17:20)    phenazopyridine   200 milliGRAM(s) Oral (03-19-19 @ 07:18)        LABS:      f/u AM labs               Allergies    No Known Allergies    Intolerances
PGY4 GYN Progress Note    POD #2  Procedure:  midurethral sling posterior colporrhaphy perineorrhaphy for mixed UI and rectocele. EBL 100cc.    Subjective: Patient is doing well. Pain well controlled. Patient is ambulating. Passed active voiding trial this morning (300cc back filled in the bladder/300cc voided). She is tolerating regular diet, passing flatus. No bowel movements yet. Nausea and dizziness have improved.  Denies chest pain/SOB, vaginal bleeding.     Physical exam:    Vital Signs Last 24 Hrs  T(C): 36.7 (21 Mar 2019 00:00), Max: 37.2 (20 Mar 2019 23:30)  T(F): 98 (21 Mar 2019 00:00), Max: 99 (20 Mar 2019 23:30)  HR: 75 (21 Mar 2019 00:00) (68 - 81)  BP: 121/56 (21 Mar 2019 00:00) (107/57 - 126/73)  RR: 18 (21 Mar 2019 00:00) (18 - 18)  SpO2: 94% (20 Mar 2019 13:57) (94% - 94%)      03-20-19 @ 07:01  -  03-21-19 @ 07:00  --------------------------------------------------------  IN: 0 mL / OUT: 2275 mL / NET: -2275 mL    UO 5770-8064 - 1100cc.     Gen: NAD, alert and oriented  CVS: RRR s1/s2 no murmurs  Lungs: CTABL  Abdomen: BS+, soft, not tender, not distended, incisions C/D/I  Pelvic: deferred, ni blood on pad  Ext: No calf tenderness or edema    Diet: regular    Meds:     acetaminophen   Tablet ..   650 milliGRAM(s) Oral (03-21-19 @ 05:37)   650 milliGRAM(s) Oral (03-20-19 @ 23:53)   650 milliGRAM(s) Oral (03-20-19 @ 17:24)   650 milliGRAM(s) Oral (03-20-19 @ 11:37)    baclofen   20 milliGRAM(s) Oral (03-21-19 @ 05:38)   20 milliGRAM(s) Oral (03-20-19 @ 17:24)    docusate sodium   100 milliGRAM(s) Oral (03-21-19 @ 05:38)   100 milliGRAM(s) Oral (03-20-19 @ 17:24)    enoxaparin Injectable   40 milliGRAM(s) SubCutaneous (03-20-19 @ 21:13)    ibuprofen  Tablet.   600 milliGRAM(s) Oral (03-21-19 @ 05:38)   600 milliGRAM(s) Oral (03-20-19 @ 23:53)   600 milliGRAM(s) Oral (03-20-19 @ 17:26)   600 milliGRAM(s) Oral (03-20-19 @ 11:37)    magnesium hydroxide Suspension   30 milliLiter(s) Oral (03-20-19 @ 17:24)    nitrofurantoin monohydrate/macrocrystals (MACROBID)   100 milliGRAM(s) Oral (03-20-19 @ 17:24)   100 milliGRAM(s) Oral (03-20-19 @ 08:06)    ondansetron Injectable   4 milliGRAM(s) IV Push (03-20-19 @ 12:33)   4 milliGRAM(s) IV Push (03-20-19 @ 09:00)    oxyCODONE    5 mG/acetaminophen 325 mG   1 Tablet(s) Oral (03-20-19 @ 21:16)   1 Tablet(s) Oral (03-20-19 @ 16:10)    pantoprazole    Tablet   40 milliGRAM(s) Oral (03-21-19 @ 05:38)   40 milliGRAM(s) Oral (03-20-19 @ 08:06)        LABS:                        10.5   11.38 )-----------( 269      ( 20 Mar 2019 10:30 )             31.2                         10.7   12.47 )-----------( 285      ( 20 Mar 2019 05:58 )             32.1       03-20-19 @ 05:58      139  |  104  |  10  ----------------------------<  123<H>  4.1   |  24  |  0.6<L>        Ca    8.4<L>      20 Mar 2019 05:58  Phos  3.7     03-20  Mg     1.8     03-20    TPro  5.6<L>  /  Alb  3.3<L>  /  TBili  0.3  /  DBili  x   /  AST  47<H>  /  ALT  109<H>  /  AlkPhos  63  03-20-19 @ 05:58              Culture - Urine (collected 03-19-19 @ 15:22)  Source: Catheterized None  Final Report (03-20-19 @ 17:24):    No growth

## 2019-03-25 ENCOUNTER — MESSAGE (OUTPATIENT)
Age: 39
End: 2019-03-25

## 2019-03-25 DIAGNOSIS — R32 UNSPECIFIED URINARY INCONTINENCE: ICD-10-CM

## 2019-03-25 DIAGNOSIS — N81.6 RECTOCELE: ICD-10-CM

## 2019-04-03 ENCOUNTER — APPOINTMENT (OUTPATIENT)
Dept: UROGYNECOLOGY | Facility: CLINIC | Age: 39
End: 2019-04-03

## 2019-04-03 ENCOUNTER — OUTPATIENT (OUTPATIENT)
Dept: OUTPATIENT SERVICES | Facility: HOSPITAL | Age: 39
LOS: 1 days | Discharge: HOME | End: 2019-04-03

## 2019-04-03 VITALS
SYSTOLIC BLOOD PRESSURE: 124 MMHG | WEIGHT: 165 LBS | HEIGHT: 60 IN | BODY MASS INDEX: 32.39 KG/M2 | DIASTOLIC BLOOD PRESSURE: 70 MMHG

## 2019-04-03 NOTE — ADDENDUM
[FreeTextEntry1] : \par Please continue with all of your postoperative restrictions\par \par Please continue with the stool softeners.\par \par You may take Miralax (over the counter) to help with the bowel movements.\par \par Call with any issues\par \par Return for your next scheduled postop visit 5/1/19

## 2019-04-03 NOTE — DISCUSSION/SUMMARY
[FreeTextEntry1] : \par Rectocele/Mixed Incontinence:\par Reviewed postoperative restrictions. All of the patient's questions and concerns were answered. Advised to return for her next scheduled postop visit or earlier if she has any issues. The patient voiced understanding and is happy with the plan.

## 2019-04-04 DIAGNOSIS — N39.46 MIXED INCONTINENCE: ICD-10-CM

## 2019-04-04 DIAGNOSIS — N81.6 RECTOCELE: ICD-10-CM

## 2019-05-01 ENCOUNTER — OUTPATIENT (OUTPATIENT)
Dept: OUTPATIENT SERVICES | Facility: HOSPITAL | Age: 39
LOS: 1 days | Discharge: HOME | End: 2019-05-01

## 2019-05-01 ENCOUNTER — APPOINTMENT (OUTPATIENT)
Dept: UROGYNECOLOGY | Facility: CLINIC | Age: 39
End: 2019-05-01
Payer: MEDICAID

## 2019-05-01 VITALS — WEIGHT: 165 LBS | SYSTOLIC BLOOD PRESSURE: 120 MMHG | DIASTOLIC BLOOD PRESSURE: 68 MMHG | BODY MASS INDEX: 32.22 KG/M2

## 2019-05-01 DIAGNOSIS — G89.18 OTHER ACUTE POSTPROCEDURAL PAIN: ICD-10-CM

## 2019-05-01 PROCEDURE — 99024 POSTOP FOLLOW-UP VISIT: CPT

## 2019-05-01 PROCEDURE — 51701 INSERT BLADDER CATHETER: CPT

## 2019-05-01 RX ORDER — BACLOFEN 20 MG/1
20 TABLET ORAL
Qty: 60 | Refills: 2 | Status: ACTIVE | OUTPATIENT
Start: 2019-02-08

## 2019-05-03 LAB — BACTERIA UR CULT: NORMAL

## 2019-05-04 NOTE — ADDENDUM
[FreeTextEntry1] : \par We will notify you of the urine results if they are abnormal\par \par Please restart the vesicare (bladder medication). I sent refills to the pharmacy\par \par Please continue to take the baclofen (muscle relaxant) for the pain. I sent refills to the pharmacy.\par \par Please continue to take the stool softeners daily. You can get them in the pharmacy.\par \par Please call your medical doctor to schedule an evaluation for your legs that feel weak.\par \par Nothing into the vagina until 10 weeks after surgery\par \par No heavy lifting until 8 weeks after surgery\par \par Schedule 6 week med check with Jon MCCLELLAND)

## 2019-05-04 NOTE — OBJECTIVE
[Post Void Residual ____ ml] : Post Void Residual was [unfilled] ml [Soft and Nontender] : soft and nontender [Good Support] : Good support [Healing well] : healing well [No Masses or Tenderness] : no masses or tenderness [FreeTextEntry3] : bilateral levator ani spasms but no tenderness, no mesh exposure, incision lines intact but sutures still present

## 2019-05-06 DIAGNOSIS — N81.6 RECTOCELE: ICD-10-CM

## 2019-05-06 DIAGNOSIS — N39.46 MIXED INCONTINENCE: ICD-10-CM

## 2019-06-12 ENCOUNTER — APPOINTMENT (OUTPATIENT)
Dept: UROGYNECOLOGY | Facility: CLINIC | Age: 39
End: 2019-06-12
Payer: MEDICAID

## 2019-06-12 ENCOUNTER — OUTPATIENT (OUTPATIENT)
Dept: OUTPATIENT SERVICES | Facility: HOSPITAL | Age: 39
LOS: 1 days | Discharge: HOME | End: 2019-06-12

## 2019-06-12 VITALS
SYSTOLIC BLOOD PRESSURE: 122 MMHG | WEIGHT: 165 LBS | DIASTOLIC BLOOD PRESSURE: 80 MMHG | HEIGHT: 60 IN | BODY MASS INDEX: 32.39 KG/M2

## 2019-06-12 PROCEDURE — 99213 OFFICE O/P EST LOW 20 MIN: CPT

## 2019-06-12 RX ORDER — SOLIFENACIN SUCCINATE 5 MG/1
5 TABLET ORAL
Qty: 30 | Refills: 5 | Status: ACTIVE | COMMUNITY
Start: 2019-02-08 | End: 1900-01-01

## 2019-06-12 RX ORDER — IBUPROFEN 600 MG/1
600 TABLET, FILM COATED ORAL EVERY 6 HOURS
Qty: 120 | Refills: 0 | Status: ACTIVE | COMMUNITY
Start: 2019-04-18 | End: 1900-01-01

## 2019-06-12 NOTE — COUNSELING
[FreeTextEntry1] : \par Please continue taking Vesicare 5 mg for frequency. Refills sent to your pharmacy.\par \par Please call my office if you have any issues with the cost or side effects of the medication. \par \par Schedule a 6 months follow up med check appointment with OWEN Webb.

## 2019-06-12 NOTE — HISTORY OF PRESENT ILLNESS
[FreeTextEntry1] : \par Patient is here for 6 weeks med check for urge incontinence.\par Last seen on 5/1/19 for post op visit.\par \par s/p TVT.cysto/posterior colporrhaphy and perineorrhaphy 3/19/19\par \par Baclofen 20 mg BID for myalgia\par Vesicare 5 mg daily mixed incontinence\par \par Today, patient states she is happy with Vesicare 5 mg daily and is noticing improvement. Denies side effects. Patient does not feel she has an infection.\par \par Patient states she continues to have pain in pelvic area and Ibuprofen 600 mg alleviates the pain.\par \par Patient would like to continue Vesicare 5 mg daily and refills on Ibuprofen 600 mg for pain.

## 2019-06-12 NOTE — DISCUSSION/SUMMARY
[FreeTextEntry1] : \par Mixed Incontinence-\par Patient happy with Vesicare 5 mg daily.\par Refills provided. 30 days supply with 5 refills.\par Precautions reviewed.\par Will return in 6 months for follow up or earlier if she has any issues.

## 2019-06-16 NOTE — ED ADULT NURSE NOTE - NS PRO PASSIVE SMOKE EXP
"Pt reports slurred speech and L sided facial droop that began around 2330 last night. Reports history of TIA's in the past. Pt reports "funny feeling" on the L side of her body. States that she was drying her daughter's hair when the symptoms started    Patient Identifiers for Hortencia Tayo checked and correct  LOC: The patient is awake, alert and aware of environment with an appropriate affect, the patient is oriented x 3 and speaking appropriate.  APPEARANCE: Patient resting comfortably and in no acute distress, patient is clean and well groomed, patient's clothing is properly fastened.  SKIN: The skin is warm and dry, patient has normal skin turgor and moist mucus membranes,no rashes or lesions.Skin Intact , No Breakdown Noted  Musculoskeletal :  Normal range of motion noted. Moves all extremeties well, No swelling or tenderness noted  RESPIRATORY: Airway is open and patent, respirations are spontaneous, patient has a normal effort and rate.  CARDIAC: Patient has a normal rate and rhythm, no periphreal edema noted, capillary refill < 3 seconds.   ABDOMEN: Soft and non tender to palpation, no distention noted.   PULSES: 2+  And symmetrical in all extremeties  NEUROLOGIC: See neuro flowsheet  Will continue to monitor    " normal (ped)... No

## 2019-06-18 DIAGNOSIS — N39.46 MIXED INCONTINENCE: ICD-10-CM

## 2019-07-18 ENCOUNTER — APPOINTMENT (OUTPATIENT)
Dept: OBGYN | Facility: CLINIC | Age: 39
End: 2019-07-18

## 2019-07-30 ENCOUNTER — EMERGENCY (EMERGENCY)
Facility: HOSPITAL | Age: 39
LOS: 0 days | Discharge: HOME | End: 2019-07-30
Admitting: EMERGENCY MEDICINE
Payer: MEDICAID

## 2019-07-30 VITALS — WEIGHT: 156.09 LBS

## 2019-07-30 VITALS
HEART RATE: 78 BPM | RESPIRATION RATE: 18 BRPM | TEMPERATURE: 98 F | OXYGEN SATURATION: 96 % | SYSTOLIC BLOOD PRESSURE: 111 MMHG | DIASTOLIC BLOOD PRESSURE: 74 MMHG

## 2019-07-30 DIAGNOSIS — Z79.899 OTHER LONG TERM (CURRENT) DRUG THERAPY: ICD-10-CM

## 2019-07-30 DIAGNOSIS — J02.9 ACUTE PHARYNGITIS, UNSPECIFIED: ICD-10-CM

## 2019-07-30 DIAGNOSIS — N39.0 URINARY TRACT INFECTION, SITE NOT SPECIFIED: ICD-10-CM

## 2019-07-30 PROCEDURE — 99283 EMERGENCY DEPT VISIT LOW MDM: CPT

## 2019-07-30 RX ORDER — DEXAMETHASONE 0.5 MG/5ML
10 ELIXIR ORAL ONCE
Refills: 0 | Status: COMPLETED | OUTPATIENT
Start: 2019-07-30 | End: 2019-07-30

## 2019-07-30 RX ORDER — NITROFURANTOIN MACROCRYSTAL 50 MG
1 CAPSULE ORAL
Qty: 14 | Refills: 0
Start: 2019-07-30 | End: 2019-08-05

## 2019-07-30 RX ADMIN — Medication 10 MILLIGRAM(S): at 22:45

## 2019-07-30 NOTE — ED PROVIDER NOTE - NS ED ROS FT
Constitutional: no fever, chills, no recent weight loss, change in appetite or malaise  Eyes: no redness/discharge/pain/vision changes  ENT: no rhinorrhea/ear pain  Cardiac: No chest pain, SOB or edema.  Respiratory: No cough or respiratory distress  GI: No nausea, vomiting, diarrhea or abdominal pain.  : No hematuria  MS: no pain to back or extremities, no loss of ROM, no weakness  Neuro: No headache or weakness. No LOC.  Skin: No skin rash.  Except as documented in the HPI, all other systems are negative.

## 2019-07-30 NOTE — ED PROVIDER NOTE - NSFOLLOWUPCLINICS_GEN_ALL_ED_FT
[facility]  ENT  [address], [address]  [place] [address]  Phone: [phone]  Fax:   Follow Up Time:     [facility]  Urology  .  [place]   Phone: [phone]  Fax:   Follow Up Time:

## 2019-07-30 NOTE — ED PROVIDER NOTE - NSFOLLOWUPINSTRUCTIONS_ED_ALL_ED_FT
Urinary Tract Infection, Adult  ImageA urinary tract infection (UTI) is an infection of any part of the urinary tract, which includes the kidneys, ureters, bladder, and urethra. These organs make, store, and get rid of urine in the body. UTI can be a bladder infection (cystitis) or kidney infection (pyelonephritis).    What are the causes?  This infection may be caused by fungi, viruses, or bacteria. Bacteria are the most common cause of UTIs. This condition can also be caused by repeated incomplete emptying of the bladder during urination.    What increases the risk?  This condition is more likely to develop if:    You ignore your need to urinate or hold urine for long periods of time.  You do not empty your bladder completely during urination.  You wipe back to front after urinating or having a bowel movement, if you are female.  You are uncircumcised, if you are male.  You are constipated.  You have a urinary catheter that stays in place (indwelling).  You have a weak defense (immune) system.  You have a medical condition that affects your bowels, kidneys, or bladder.  You have diabetes.  You take antibiotic medicines frequently or for long periods of time, and the antibiotics no longer work well against certain types of infections (antibiotic resistance).  You take medicines that irritate your urinary tract.  You are exposed to chemicals that irritate your urinary tract.  You are female.    What are the signs or symptoms?  Symptoms of this condition include:    Fever.  Frequent urination or passing small amounts of urine frequently.  Needing to urinate urgently.  Pain or burning with urination.  Urine that smells bad or unusual.  Cloudy urine.  Pain in the lower abdomen or back.  Trouble urinating.  Blood in the urine.  Vomiting or being less hungry than normal.  Diarrhea or abdominal pain.  Vaginal discharge, if you are female.    How is this diagnosed?  This condition is diagnosed with a medical history and physical exam. You will also need to provide a urine sample to test your urine. Other tests may be done, including:    Blood tests.  Sexually transmitted disease (STD) testing.    If you have had more than one UTI, a cystoscopy or imaging studies may be done to determine the cause of the infections.    How is this treated?  Treatment for this condition often includes a combination of two or more of the following:    Antibiotic medicine.  Other medicines to treat less common causes of UTI.  Over-the-counter medicines to treat pain.  Drinking enough water to stay hydrated.    Follow these instructions at home:  Take over-the-counter and prescription medicines only as told by your health care provider.  If you were prescribed an antibiotic, take it as told by your health care provider. Do not stop taking the antibiotic even if you start to feel better.  Avoid alcohol, caffeine, tea, and carbonated beverages. They can irritate your bladder.  Drink enough fluid to keep your urine clear or pale yellow.  Keep all follow-up visits as told by your health care provider. This is important.  ImageMake sure to:    Empty your bladder often and completely. Do not hold urine for long periods of time.  Empty your bladder before and after sex.  Wipe from front to back after a bowel movement if you are female. Use each tissue one time when you wipe.    Contact a health care provider if:  You have back pain.  You have a fever.  You feel nauseous or vomit.  Your symptoms do not get better after 3 days.  Your symptoms go away and then return.  Get help right away if:  You have severe back pain or lower abdominal pain.  You are vomiting and cannot keep down any medicines or water.  This information is not intended to replace advice given to you by your health care provider. Make sure you discuss any questions you have with your health care provider.    Pharyngitis    Pharyngitis is redness, pain, and swelling (inflammation) of your pharynx. Pharyngitis is usually caused by an infection which can be viral or bacterial in origin. Pharyngitis can be contagious and may spread from person to person through intimate contact, coughing, sneezing, or sharing personal items and utensils. Symptoms of pharyngitis may include sore throat, fever, headache, or swollen lymph nodes. If you are prescribed antibiotics, make sure you finish them even if you start to feel better. Gargle with 8 oz of salt water (½ tsp of salt per 1 qt of water) as often as every 1–2 hours to soothe your throat. Throat lozenges (if you are not at risk for choking) or sprays may be used to soothe your throat.    SEEK IMMEDIATE MEDICAL CARE IF YOU HAVE THE FOLLOWING SYMPTOMS: neck stiffness, drooling, inability to swallow liquids, vomiting and inability to keep medicine/liquid down, or trouble breathing.

## 2019-07-30 NOTE — ED PROVIDER NOTE - PHYSICAL EXAMINATION
CONSTITUTIONAL: Well-appearing; well-nourished; in no apparent distress.   ENT: +tonsillar enlargement mildly with mild erythema; normal nose; no rhinorrhea  NECK: Supple; non-tender; no cervical lymphadenopathy. No JVD.   GI/: non-distended; non-tender; no palpable organomegaly.   MS: No CVA tenderness.  SKIN: Normal for age and race; warm; dry; good turgor; no apparent lesions or exudate.   NEURO/PSYCH: A & O x 4; grossly unremarkable. mood and manner are appropriate. Grooming and personal hygiene are appropriate. No apparent thoughts of harm to self or others.

## 2019-07-30 NOTE — ED PROVIDER NOTE - PROGRESS NOTE DETAILS
pt looks well. etseban PO.  no obvious PTA. No drooling, stridor, trismus, muffled voice, or  fluctuance. Uvula midline   r/b of treatment dw pt. role of throat cx dw pt.  dw pt list of potential causes include viral/bacterial, including Mono, STDs and will return if worse  Pt advised of risk of development of Peritonsillar abscess, currently not seen on PE, agrees to seek further care if no improvement in 48 hours or any concerns  consistent with urinary tract infection.  no evidence of pyelo.  cx pending d/w pt.  vaginitis considered, not supported clinically.   pt aware needs immediate f/u if new or worse symptoms.   pt aware we did not rule out STD with todays visit and needs pelvic exam and further testing if worse, new or persistent symptoms.  limits of abx dw pt. pt aware of need to return if back pain or fever.  pt aware of limitations of abx and potential need to change abx based on cx or new/worse sx.

## 2019-07-31 ENCOUNTER — OUTPATIENT (OUTPATIENT)
Dept: OUTPATIENT SERVICES | Facility: HOSPITAL | Age: 39
LOS: 1 days | Discharge: HOME | End: 2019-07-31

## 2019-07-31 DIAGNOSIS — R53.83 OTHER FATIGUE: ICD-10-CM

## 2019-07-31 DIAGNOSIS — E55.9 VITAMIN D DEFICIENCY, UNSPECIFIED: ICD-10-CM

## 2019-08-01 ENCOUNTER — EMERGENCY (EMERGENCY)
Facility: HOSPITAL | Age: 39
LOS: 0 days | Discharge: HOME | End: 2019-08-01
Admitting: EMERGENCY MEDICINE
Payer: MEDICAID

## 2019-08-01 ENCOUNTER — OUTPATIENT (OUTPATIENT)
Dept: OUTPATIENT SERVICES | Facility: HOSPITAL | Age: 39
LOS: 1 days | End: 2019-08-01
Payer: MEDICAID

## 2019-08-01 VITALS
OXYGEN SATURATION: 97 % | RESPIRATION RATE: 20 BRPM | HEART RATE: 77 BPM | DIASTOLIC BLOOD PRESSURE: 66 MMHG | SYSTOLIC BLOOD PRESSURE: 121 MMHG | TEMPERATURE: 99 F

## 2019-08-01 DIAGNOSIS — H92.01 OTALGIA, RIGHT EAR: ICD-10-CM

## 2019-08-01 DIAGNOSIS — Z79.891 LONG TERM (CURRENT) USE OF OPIATE ANALGESIC: ICD-10-CM

## 2019-08-01 DIAGNOSIS — R05 COUGH: ICD-10-CM

## 2019-08-01 DIAGNOSIS — Z79.899 OTHER LONG TERM (CURRENT) DRUG THERAPY: ICD-10-CM

## 2019-08-01 DIAGNOSIS — J06.9 ACUTE UPPER RESPIRATORY INFECTION, UNSPECIFIED: ICD-10-CM

## 2019-08-01 LAB
CULTURE RESULTS: SIGNIFICANT CHANGE UP
SPECIMEN SOURCE: SIGNIFICANT CHANGE UP

## 2019-08-01 PROCEDURE — G9001: CPT

## 2019-08-01 PROCEDURE — 71046 X-RAY EXAM CHEST 2 VIEWS: CPT | Mod: 26

## 2019-08-01 PROCEDURE — 99283 EMERGENCY DEPT VISIT LOW MDM: CPT

## 2019-08-01 RX ORDER — PSEUDOEPHEDRINE HCL 30 MG
1 TABLET ORAL
Qty: 12 | Refills: 0
Start: 2019-08-01 | End: 2019-08-03

## 2019-08-01 RX ORDER — ALBUTEROL 90 UG/1
2 AEROSOL, METERED ORAL
Qty: 1 | Refills: 0
Start: 2019-08-01 | End: 2019-08-05

## 2019-08-01 NOTE — ED ADULT NURSE NOTE - OBJECTIVE STATEMENT
Patient present to ED with complains of a cough for about 3 week, patient currently being treated for UTI with macrobid, denies fever, chills, nausea, vomiting, and diarrhea.

## 2019-08-01 NOTE — ED ADULT NURSE NOTE - PAIN: BODY LOCATION
Chronic diastolic CHF  History of DVT -- not on anticoagulation  Controlled essential HTN  Undergoing PT/SW evaluation -- patient lives alone and relies on next door neighbors for assistance.  ?NH candidate. chest and back when coughing

## 2019-08-01 NOTE — ED PROVIDER NOTE - NS ED ROS FT
CONST: No chills or bodyaches  EYES: No pain, redness, drainage or visual changes.  ENT: (+) right ear pain, No discharge, (+) nasal discharge/ congestion. (+) sore throat  CARD: No chest pain, palpitations  RESP: No SOB, hemoptysis. No hx of asthma or COPD  GI: No abdominal pain, N/V/D  MS: No joint pain, back pain or extremity pain/injury  SKIN: No rashes  NEURO: No headache, dizziness, paresthesias or LOC

## 2019-08-01 NOTE — ED PROVIDER NOTE - CLINICAL SUMMARY MEDICAL DECISION MAKING FREE TEXT BOX
Pt with URI symptoms. Lungs CTA. CXR neg. Nasal congestion. Sore throat. Sick contacts at home. Will treat with tesslon, sudafed and albuterol inhaler and Fu with med clinic. I have discussed the discharge plan with the patient. The patient agrees with the plan, as discussed.  The patient understands Emergency Department diagnosis is a preliminary diagnosis often based on limited information and that the patient must adhere to the follow-up plan as discussed.  The patient understands that if the symptoms worsen or if prescribed medications do not have the desired/planned effect that the patient may return to the Emergency Department at any time for further evaluation and treatment.

## 2019-08-01 NOTE — ED PROVIDER NOTE - NSFOLLOWUPCLINICS_GEN_ALL_ED_FT
[facility]  Medicine  [address], [place]   Phone: [phone]  Fax:   Follow Up Time: 4-6 Days

## 2019-08-01 NOTE — ED ADULT TRIAGE NOTE - NS ED NURSE DIRECT TO ROOM YN
All in agreement with d/c to home today with [facility] for nursing, PT, and OT. Pt has declined SNF and also we have not received auth(no one can verify if they are in network with his insurance). [facility], at [phone] will transport him ~12n to home via wheelchair van(confirmation # [id number]). They will come to the room and tell me they will probably be late. FOC on chart No

## 2019-08-01 NOTE — ED PROVIDER NOTE - OBJECTIVE STATEMENT
1 week of intermittent moderate dry cough and runny nose, subjective fevers, sore throat, back pain when coughing. Son at home sick with URI. Seen here 2 days ago for same and given decadron.

## 2019-08-01 NOTE — ED PROVIDER NOTE - PHYSICAL EXAMINATION
CONST: Well appearing in NAD  EYES: PERRL, EOMI, Sclera and conjunctiva clear.   ENT: (+) nasal discharge. No sinus tenderness TM's clear B/L without drainage. Oropharynx normal appearing, no erythema or exudates. Uvula midline.  NECK: Non-tender, no meningeal signs  CARD: Normal S1 S2; Normal rate and rhythm  RESP: Equal BS B/L, No wheezes, rhonchi or rales. No distress  GI: Soft, non-tender, non-distended. No CVAT  MS: Normal ROM in all extremities. No midline spinal tenderness.  SKIN: Warm, dry, no acute rashes. Good turgor  NEURO: A&Ox3, No focal deficits. Strength 5/5 with no sensory deficits. Steady gait

## 2019-08-08 DIAGNOSIS — Z71.89 OTHER SPECIFIED COUNSELING: ICD-10-CM

## 2019-09-11 ENCOUNTER — OUTPATIENT (OUTPATIENT)
Dept: OUTPATIENT SERVICES | Facility: HOSPITAL | Age: 39
LOS: 1 days | Discharge: HOME | End: 2019-09-11

## 2019-09-11 DIAGNOSIS — R94.5 ABNORMAL RESULTS OF LIVER FUNCTION STUDIES: ICD-10-CM

## 2019-09-11 DIAGNOSIS — E11.65 TYPE 2 DIABETES MELLITUS WITH HYPERGLYCEMIA: ICD-10-CM

## 2019-11-07 ENCOUNTER — APPOINTMENT (OUTPATIENT)
Dept: OBGYN | Facility: CLINIC | Age: 39
End: 2019-11-07

## 2019-12-11 ENCOUNTER — APPOINTMENT (OUTPATIENT)
Dept: UROGYNECOLOGY | Facility: CLINIC | Age: 39
End: 2019-12-11

## 2020-01-14 ENCOUNTER — EMERGENCY (EMERGENCY)
Facility: HOSPITAL | Age: 40
LOS: 0 days | Discharge: HOME | End: 2020-01-14
Admitting: EMERGENCY MEDICINE
Payer: MEDICAID

## 2020-01-14 VITALS
HEART RATE: 70 BPM | TEMPERATURE: 98 F | SYSTOLIC BLOOD PRESSURE: 121 MMHG | DIASTOLIC BLOOD PRESSURE: 83 MMHG | RESPIRATION RATE: 16 BRPM

## 2020-01-14 VITALS
SYSTOLIC BLOOD PRESSURE: 120 MMHG | DIASTOLIC BLOOD PRESSURE: 84 MMHG | HEART RATE: 67 BPM | TEMPERATURE: 98 F | OXYGEN SATURATION: 98 % | RESPIRATION RATE: 18 BRPM

## 2020-01-14 DIAGNOSIS — Z79.899 OTHER LONG TERM (CURRENT) DRUG THERAPY: ICD-10-CM

## 2020-01-14 DIAGNOSIS — Z79.51 LONG TERM (CURRENT) USE OF INHALED STEROIDS: ICD-10-CM

## 2020-01-14 DIAGNOSIS — Z79.2 LONG TERM (CURRENT) USE OF ANTIBIOTICS: ICD-10-CM

## 2020-01-14 DIAGNOSIS — N39.0 URINARY TRACT INFECTION, SITE NOT SPECIFIED: ICD-10-CM

## 2020-01-14 DIAGNOSIS — R30.0 DYSURIA: ICD-10-CM

## 2020-01-14 PROCEDURE — 99283 EMERGENCY DEPT VISIT LOW MDM: CPT

## 2020-01-14 RX ORDER — CEFUROXIME AXETIL 250 MG
1 TABLET ORAL
Qty: 14 | Refills: 0
Start: 2020-01-14 | End: 2020-01-20

## 2020-01-14 RX ORDER — FLUCONAZOLE 150 MG/1
1 TABLET ORAL
Qty: 1 | Refills: 0
Start: 2020-01-14 | End: 2020-01-14

## 2020-01-14 NOTE — ED ADULT TRIAGE NOTE - MODE OF ARRIVAL
Spoke with Dr Peña, hourly accu checks to commence after completion of 2nd liter of NS and prior to initiation of insulin drip   Walk in

## 2020-01-14 NOTE — ED PROVIDER NOTE - NSFOLLOWUPCLINICS_GEN_ALL_ED_FT
[facility]  Medicine  [address], [place]   Phone: [phone]  Fax:   Follow Up Time: Urgent

## 2020-01-14 NOTE — ED PROVIDER NOTE - PATIENT PORTAL LINK FT
You can access the FollowMyHealth Patient Portal offered by [facility] by registering at the following website: [email or web address]. By joining [employer]’s FollowMyHealth portal, you will also be able to view your health information using other applications (apps) compatible with our system.

## 2020-01-14 NOTE — ED ADULT NURSE NOTE - NSIMPLEMENTINTERV_GEN_ALL_ED
Implemented All Fall Risk Interventions:  [place] to call system. Call bell, personal items and telephone within reach. Instruct patient to call for assistance. Room bathroom lighting operational. Non-slip footwear when patient is off stretcher. Physically safe environment: no spills, clutter or unnecessary equipment. Stretcher in lowest position, wheels locked, appropriate side rails in place. Provide visual cue, wrist band, yellow gown, etc. Monitor gait and stability. Monitor for mental status changes and reorient to person, place, and time. Review medications for side effects contributing to fall risk. Reinforce activity limits and safety measures with patient and family.

## 2020-01-14 NOTE — ED PROVIDER NOTE - OBJECTIVE STATEMENT
40 y/o F, no significant PMHx, presents to the ED with complaints of dysuria x five days. She admits to associated urinary frequency ; denies nausea, vomiting, abdominal pain, fever and chills. Her LMP was 12/20/2019.

## 2020-02-01 ENCOUNTER — OUTPATIENT (OUTPATIENT)
Dept: OUTPATIENT SERVICES | Facility: HOSPITAL | Age: 40
LOS: 1 days | End: 2020-02-01
Payer: MEDICAID

## 2020-02-01 PROCEDURE — G9001: CPT

## 2020-02-07 DIAGNOSIS — Z71.89 OTHER SPECIFIED COUNSELING: ICD-10-CM

## 2020-07-12 ENCOUNTER — EMERGENCY (EMERGENCY)
Facility: HOSPITAL | Age: 40
LOS: 0 days | Discharge: HOME | End: 2020-07-12
Attending: STUDENT IN AN ORGANIZED HEALTH CARE EDUCATION/TRAINING PROGRAM | Admitting: STUDENT IN AN ORGANIZED HEALTH CARE EDUCATION/TRAINING PROGRAM
Payer: MEDICAID

## 2020-07-12 VITALS
DIASTOLIC BLOOD PRESSURE: 77 MMHG | WEIGHT: 158.95 LBS | HEART RATE: 72 BPM | SYSTOLIC BLOOD PRESSURE: 133 MMHG | TEMPERATURE: 99 F | RESPIRATION RATE: 18 BRPM | OXYGEN SATURATION: 97 %

## 2020-07-12 DIAGNOSIS — J45.909 UNSPECIFIED ASTHMA, UNCOMPLICATED: ICD-10-CM

## 2020-07-12 DIAGNOSIS — N39.0 URINARY TRACT INFECTION, SITE NOT SPECIFIED: ICD-10-CM

## 2020-07-12 DIAGNOSIS — R30.0 DYSURIA: ICD-10-CM

## 2020-07-12 LAB
ALBUMIN SERPL ELPH-MCNC: 4.4 G/DL — SIGNIFICANT CHANGE UP (ref 3.5–5.2)
ALP SERPL-CCNC: 73 U/L — SIGNIFICANT CHANGE UP (ref 30–115)
ALT FLD-CCNC: 35 U/L — SIGNIFICANT CHANGE UP (ref 0–41)
ANION GAP SERPL CALC-SCNC: 11 MMOL/L — SIGNIFICANT CHANGE UP (ref 7–14)
APPEARANCE UR: CLEAR — SIGNIFICANT CHANGE UP
AST SERPL-CCNC: 23 U/L — SIGNIFICANT CHANGE UP (ref 0–41)
BACTERIA # UR AUTO: ABNORMAL
BASOPHILS # BLD AUTO: 0.04 K/UL — SIGNIFICANT CHANGE UP (ref 0–0.2)
BASOPHILS NFR BLD AUTO: 0.4 % — SIGNIFICANT CHANGE UP (ref 0–1)
BILIRUB DIRECT SERPL-MCNC: <0.2 MG/DL — SIGNIFICANT CHANGE UP (ref 0–0.2)
BILIRUB INDIRECT FLD-MCNC: >0.1 MG/DL — LOW (ref 0.2–1.2)
BILIRUB SERPL-MCNC: 0.3 MG/DL — SIGNIFICANT CHANGE UP (ref 0.2–1.2)
BILIRUB UR-MCNC: NEGATIVE — SIGNIFICANT CHANGE UP
BUN SERPL-MCNC: 12 MG/DL — SIGNIFICANT CHANGE UP (ref 10–20)
CALCIUM SERPL-MCNC: 9.3 MG/DL — SIGNIFICANT CHANGE UP (ref 8.5–10.1)
CHLORIDE SERPL-SCNC: 105 MMOL/L — SIGNIFICANT CHANGE UP (ref 98–110)
CO2 SERPL-SCNC: 20 MMOL/L — SIGNIFICANT CHANGE UP (ref 17–32)
COLOR SPEC: YELLOW — SIGNIFICANT CHANGE UP
CREAT SERPL-MCNC: 0.6 MG/DL — LOW (ref 0.7–1.5)
DIFF PNL FLD: NEGATIVE — SIGNIFICANT CHANGE UP
EOSINOPHIL # BLD AUTO: 0.29 K/UL — SIGNIFICANT CHANGE UP (ref 0–0.7)
EOSINOPHIL NFR BLD AUTO: 3.1 % — SIGNIFICANT CHANGE UP (ref 0–8)
EPI CELLS # UR: 1 /HPF — SIGNIFICANT CHANGE UP (ref 0–5)
GLUCOSE SERPL-MCNC: 105 MG/DL — HIGH (ref 70–99)
GLUCOSE UR QL: NEGATIVE — SIGNIFICANT CHANGE UP
HCT VFR BLD CALC: 40.8 % — SIGNIFICANT CHANGE UP (ref 37–47)
HGB BLD-MCNC: 13.6 G/DL — SIGNIFICANT CHANGE UP (ref 12–16)
HYALINE CASTS # UR AUTO: 1 /LPF — SIGNIFICANT CHANGE UP (ref 0–7)
IMM GRANULOCYTES NFR BLD AUTO: 0.3 % — SIGNIFICANT CHANGE UP (ref 0.1–0.3)
KETONES UR-MCNC: NEGATIVE — SIGNIFICANT CHANGE UP
LACTATE SERPL-SCNC: 1.1 MMOL/L — SIGNIFICANT CHANGE UP (ref 0.7–2)
LEUKOCYTE ESTERASE UR-ACNC: ABNORMAL
LIDOCAIN IGE QN: 45 U/L — SIGNIFICANT CHANGE UP (ref 7–60)
LYMPHOCYTES # BLD AUTO: 2.68 K/UL — SIGNIFICANT CHANGE UP (ref 1.2–3.4)
LYMPHOCYTES # BLD AUTO: 28.6 % — SIGNIFICANT CHANGE UP (ref 20.5–51.1)
MCHC RBC-ENTMCNC: 30.7 PG — SIGNIFICANT CHANGE UP (ref 27–31)
MCHC RBC-ENTMCNC: 33.3 G/DL — SIGNIFICANT CHANGE UP (ref 32–37)
MCV RBC AUTO: 92.1 FL — SIGNIFICANT CHANGE UP (ref 81–99)
MONOCYTES # BLD AUTO: 0.78 K/UL — HIGH (ref 0.1–0.6)
MONOCYTES NFR BLD AUTO: 8.3 % — SIGNIFICANT CHANGE UP (ref 1.7–9.3)
NEUTROPHILS # BLD AUTO: 5.55 K/UL — SIGNIFICANT CHANGE UP (ref 1.4–6.5)
NEUTROPHILS NFR BLD AUTO: 59.3 % — SIGNIFICANT CHANGE UP (ref 42.2–75.2)
NITRITE UR-MCNC: NEGATIVE — SIGNIFICANT CHANGE UP
NRBC # BLD: 0 /100 WBCS — SIGNIFICANT CHANGE UP (ref 0–0)
PH UR: 6.5 — SIGNIFICANT CHANGE UP (ref 5–8)
PLATELET # BLD AUTO: 336 K/UL — SIGNIFICANT CHANGE UP (ref 130–400)
POTASSIUM SERPL-MCNC: 4.4 MMOL/L — SIGNIFICANT CHANGE UP (ref 3.5–5)
POTASSIUM SERPL-SCNC: 4.4 MMOL/L — SIGNIFICANT CHANGE UP (ref 3.5–5)
PROT SERPL-MCNC: 7.8 G/DL — SIGNIFICANT CHANGE UP (ref 6–8)
PROT UR-MCNC: NEGATIVE — SIGNIFICANT CHANGE UP
RBC # BLD: 4.43 M/UL — SIGNIFICANT CHANGE UP (ref 4.2–5.4)
RBC # FLD: 13.5 % — SIGNIFICANT CHANGE UP (ref 11.5–14.5)
RBC CASTS # UR COMP ASSIST: 3 /HPF — SIGNIFICANT CHANGE UP (ref 0–4)
SODIUM SERPL-SCNC: 136 MMOL/L — SIGNIFICANT CHANGE UP (ref 135–146)
SP GR SPEC: 1 — LOW (ref 1.01–1.02)
UROBILINOGEN FLD QL: SIGNIFICANT CHANGE UP
WBC # BLD: 9.37 K/UL — SIGNIFICANT CHANGE UP (ref 4.8–10.8)
WBC # FLD AUTO: 9.37 K/UL — SIGNIFICANT CHANGE UP (ref 4.8–10.8)
WBC UR QL: 47 /HPF — HIGH (ref 0–5)

## 2020-07-12 PROCEDURE — 99285 EMERGENCY DEPT VISIT HI MDM: CPT

## 2020-07-12 PROCEDURE — 74177 CT ABD & PELVIS W/CONTRAST: CPT | Mod: 26

## 2020-07-12 RX ORDER — SODIUM CHLORIDE 9 MG/ML
1000 INJECTION INTRAMUSCULAR; INTRAVENOUS; SUBCUTANEOUS ONCE
Refills: 0 | Status: COMPLETED | OUTPATIENT
Start: 2020-07-12 | End: 2020-07-12

## 2020-07-12 RX ORDER — CEFUROXIME AXETIL 250 MG
1 TABLET ORAL
Qty: 14 | Refills: 0
Start: 2020-07-12 | End: 2020-07-18

## 2020-07-12 RX ORDER — CEFTRIAXONE 500 MG/1
1000 INJECTION, POWDER, FOR SOLUTION INTRAMUSCULAR; INTRAVENOUS ONCE
Refills: 0 | Status: COMPLETED | OUTPATIENT
Start: 2020-07-12 | End: 2020-07-12

## 2020-07-12 RX ADMIN — CEFTRIAXONE 100 MILLIGRAM(S): 500 INJECTION, POWDER, FOR SOLUTION INTRAMUSCULAR; INTRAVENOUS at 10:02

## 2020-07-12 RX ADMIN — SODIUM CHLORIDE 1000 MILLILITER(S): 9 INJECTION INTRAMUSCULAR; INTRAVENOUS; SUBCUTANEOUS at 08:55

## 2020-07-12 NOTE — ED PROVIDER NOTE - PHYSICAL EXAMINATION
VITAL SIGNS: I have reviewed nursing notes and confirm.  CONSTITUTIONAL: Well-developed; well-nourished; in no acute distress.   SKIN: skin exam is warm and dry, no acute rash.    HEAD: Normocephalic; atraumatic.  EYES: conjunctiva and sclera clear.  ENT: No nasal discharge; airway clear.  NECK: Supple; non tender.  CARD: S1, S2 normal; no murmurs, gallops, or rubs. Regular rate and rhythm.   RESP: No wheezes, rales or rhonchi.  ABD: Normal bowel sounds; soft; non-distended; non-tender; + Left CVA tenderness  EXT: Normal ROM.  No clubbing, cyanosis or edema.   LYMPH: No acute cervical adenopathy.  NEURO: Alert, oriented, grossly unremarkable  PSYCH: Cooperative, appropriate.

## 2020-07-12 NOTE — ED PROVIDER NOTE - NS ED ROS FT
Cardiac:  No chest pain, SOB or edema. No chest pain with exertion.  Respiratory:  No cough or respiratory distress. No hemoptysis. No history of asthma or RAD.  GI:  No nausea, vomiting, diarrhea or abdominal pain.  :  + burning + flank pain, No dysuria, frequency  MS:  No myalgia, muscle weakness, joint pain or back pain.  Neuro:  No headache or weakness.  No LOC.  Skin:  No skin rash.   Endocrine: No history of thyroid disease or diabetes.  Except as documented in the HPI,  all other systems are negative.

## 2020-07-12 NOTE — ED PROVIDER NOTE - NSFOLLOWUPCLINICS_GEN_ALL_ED_FT
[facility]  Medicine  [address], [place]   Phone: [phone]  Fax:   Follow Up Time:

## 2020-07-12 NOTE — ED PROVIDER NOTE - PATIENT PORTAL LINK FT
You can access the FollowMyHealth Patient Portal offered by [facility] by registering at the following website: [email or web address]. By joining [employer]’s FollowMyHealth portal, you will also be able to view your health information using other applications (apps) compatible with our system.

## 2020-07-12 NOTE — ED PROVIDER NOTE - OBJECTIVE STATEMENT
Pt is a 39y/o female presents today for eval of dysuria with associate dleft flank pain x 3 days with no aggravating/alleviating factors. Pt denies fever, chills, n/v/d, abd pain, CP, SOB.

## 2020-07-12 NOTE — ED ADULT NURSE NOTE - NSIMPLEMENTINTERV_GEN_ALL_ED
Implemented All Universal Safety Interventions:  [place] to call system. Call bell, personal items and telephone within reach. Instruct patient to call for assistance. Room bathroom lighting operational. Non-slip footwear when patient is off stretcher. Physically safe environment: no spills, clutter or unnecessary equipment. Stretcher in lowest position, wheels locked, appropriate side rails in place.

## 2020-07-12 NOTE — ED PROVIDER NOTE - CLINICAL SUMMARY MEDICAL DECISION MAKING FREE TEXT BOX
pt w/ dysuria, flank pain. labs negative, kidney stone nonobstructing, pt improving w/ supportive care. stable for d/c w/ oral abx and return precautions

## 2020-07-12 NOTE — ED PROVIDER NOTE - ATTENDING CONTRIBUTION TO CARE
41 yo f hx uti, kidney stones, asthma here for dysuria and L flank pain. sx began 3 days ago. sx began as dysuria and pain began to radiate to L flank. no n/v/fever/chills. no syncope. no recent abx.    vss  gen- NAD, aaox3  card-rrr  lungs-ctab, no wheezing or rhonchi  abd-sntnd, no guarding or rebound, + mild LCVAT  neuro- full str/sensation, cn ii-xii grossly intact, normal coordination    possible mild pyelo, r/o infected stone  belly labs, ua, pregnancy test, ctap, will provide supportive care, serial exam and ED observation period

## 2020-07-26 ENCOUNTER — EMERGENCY (EMERGENCY)
Facility: HOSPITAL | Age: 40
LOS: 0 days | Discharge: HOME | End: 2020-07-26
Attending: EMERGENCY MEDICINE | Admitting: EMERGENCY MEDICINE
Payer: MEDICAID

## 2020-07-26 VITALS
DIASTOLIC BLOOD PRESSURE: 83 MMHG | SYSTOLIC BLOOD PRESSURE: 129 MMHG | HEART RATE: 71 BPM | RESPIRATION RATE: 18 BRPM | WEIGHT: 160.06 LBS | TEMPERATURE: 99 F | OXYGEN SATURATION: 98 %

## 2020-07-26 DIAGNOSIS — N39.0 URINARY TRACT INFECTION, SITE NOT SPECIFIED: ICD-10-CM

## 2020-07-26 DIAGNOSIS — Z79.82 LONG TERM (CURRENT) USE OF ASPIRIN: ICD-10-CM

## 2020-07-26 DIAGNOSIS — Z87.440 PERSONAL HISTORY OF URINARY (TRACT) INFECTIONS: ICD-10-CM

## 2020-07-26 DIAGNOSIS — Z79.891 LONG TERM (CURRENT) USE OF OPIATE ANALGESIC: ICD-10-CM

## 2020-07-26 DIAGNOSIS — Z79.899 OTHER LONG TERM (CURRENT) DRUG THERAPY: ICD-10-CM

## 2020-07-26 DIAGNOSIS — Z87.442 PERSONAL HISTORY OF URINARY CALCULI: ICD-10-CM

## 2020-07-26 DIAGNOSIS — R30.0 DYSURIA: ICD-10-CM

## 2020-07-26 DIAGNOSIS — N76.0 ACUTE VAGINITIS: ICD-10-CM

## 2020-07-26 LAB
ALBUMIN SERPL ELPH-MCNC: 4.4 G/DL — SIGNIFICANT CHANGE UP (ref 3.5–5.2)
ALP SERPL-CCNC: 73 U/L — SIGNIFICANT CHANGE UP (ref 30–115)
ALT FLD-CCNC: 39 U/L — SIGNIFICANT CHANGE UP (ref 0–41)
ANION GAP SERPL CALC-SCNC: 10 MMOL/L — SIGNIFICANT CHANGE UP (ref 7–14)
APPEARANCE UR: ABNORMAL
AST SERPL-CCNC: 32 U/L — SIGNIFICANT CHANGE UP (ref 0–41)
BACTERIA # UR AUTO: NEGATIVE — SIGNIFICANT CHANGE UP
BILIRUB SERPL-MCNC: <0.2 MG/DL — SIGNIFICANT CHANGE UP (ref 0.2–1.2)
BILIRUB UR-MCNC: NEGATIVE — SIGNIFICANT CHANGE UP
BUN SERPL-MCNC: 12 MG/DL — SIGNIFICANT CHANGE UP (ref 10–20)
CALCIUM SERPL-MCNC: 8.8 MG/DL — SIGNIFICANT CHANGE UP (ref 8.5–10.1)
CHLORIDE SERPL-SCNC: 106 MMOL/L — SIGNIFICANT CHANGE UP (ref 98–110)
CO2 SERPL-SCNC: 23 MMOL/L — SIGNIFICANT CHANGE UP (ref 17–32)
COLOR SPEC: COLORLESS — SIGNIFICANT CHANGE UP
CREAT SERPL-MCNC: 0.6 MG/DL — LOW (ref 0.7–1.5)
DIFF PNL FLD: SIGNIFICANT CHANGE UP
EPI CELLS # UR: 0 /HPF — SIGNIFICANT CHANGE UP (ref 0–5)
GLUCOSE SERPL-MCNC: 111 MG/DL — HIGH (ref 70–99)
GLUCOSE UR QL: NEGATIVE — SIGNIFICANT CHANGE UP
HCG UR QL: NEGATIVE — SIGNIFICANT CHANGE UP
HCT VFR BLD CALC: 40.5 % — SIGNIFICANT CHANGE UP (ref 37–47)
HGB BLD-MCNC: 13.4 G/DL — SIGNIFICANT CHANGE UP (ref 12–16)
HYALINE CASTS # UR AUTO: 1 /LPF — SIGNIFICANT CHANGE UP (ref 0–7)
KETONES UR-MCNC: NEGATIVE — SIGNIFICANT CHANGE UP
LEUKOCYTE ESTERASE UR-ACNC: ABNORMAL
MCHC RBC-ENTMCNC: 30.2 PG — SIGNIFICANT CHANGE UP (ref 27–31)
MCHC RBC-ENTMCNC: 33.1 G/DL — SIGNIFICANT CHANGE UP (ref 32–37)
MCV RBC AUTO: 91.2 FL — SIGNIFICANT CHANGE UP (ref 81–99)
NITRITE UR-MCNC: NEGATIVE — SIGNIFICANT CHANGE UP
NRBC # BLD: 0 /100 WBCS — SIGNIFICANT CHANGE UP (ref 0–0)
PH UR: 6.5 — SIGNIFICANT CHANGE UP (ref 5–8)
PLATELET # BLD AUTO: 311 K/UL — SIGNIFICANT CHANGE UP (ref 130–400)
POTASSIUM SERPL-MCNC: 4.7 MMOL/L — SIGNIFICANT CHANGE UP (ref 3.5–5)
POTASSIUM SERPL-SCNC: 4.7 MMOL/L — SIGNIFICANT CHANGE UP (ref 3.5–5)
PROT SERPL-MCNC: 7.3 G/DL — SIGNIFICANT CHANGE UP (ref 6–8)
PROT UR-MCNC: NEGATIVE — SIGNIFICANT CHANGE UP
RBC # BLD: 4.44 M/UL — SIGNIFICANT CHANGE UP (ref 4.2–5.4)
RBC # FLD: 13.4 % — SIGNIFICANT CHANGE UP (ref 11.5–14.5)
RBC CASTS # UR COMP ASSIST: 1 /HPF — SIGNIFICANT CHANGE UP (ref 0–4)
SODIUM SERPL-SCNC: 139 MMOL/L — SIGNIFICANT CHANGE UP (ref 135–146)
SP GR SPEC: 1 — LOW (ref 1.01–1.02)
UROBILINOGEN FLD QL: SIGNIFICANT CHANGE UP
WBC # BLD: 9.88 K/UL — SIGNIFICANT CHANGE UP (ref 4.8–10.8)
WBC # FLD AUTO: 9.88 K/UL — SIGNIFICANT CHANGE UP (ref 4.8–10.8)
WBC UR QL: 128 /HPF — HIGH (ref 0–5)

## 2020-07-26 PROCEDURE — 99285 EMERGENCY DEPT VISIT HI MDM: CPT

## 2020-07-26 PROCEDURE — 76856 US EXAM PELVIC COMPLETE: CPT | Mod: 26

## 2020-07-26 RX ORDER — SODIUM CHLORIDE 9 MG/ML
1000 INJECTION, SOLUTION INTRAVENOUS ONCE
Refills: 0 | Status: COMPLETED | OUTPATIENT
Start: 2020-07-26 | End: 2020-07-26

## 2020-07-26 RX ORDER — METRONIDAZOLE 500 MG
1 TABLET ORAL
Qty: 14 | Refills: 0
Start: 2020-07-26 | End: 2020-08-01

## 2020-07-26 RX ADMIN — SODIUM CHLORIDE 1000 MILLILITER(S): 9 INJECTION, SOLUTION INTRAVENOUS at 06:30

## 2020-07-26 NOTE — ED PROVIDER NOTE - PATIENT PORTAL LINK FT
You can access the FollowMyHealth Patient Portal offered by [facility] by registering at the following website: [email or web address]. By joining [employer]’s FollowMyHealth portal, you will also be able to view your health information using other applications (apps) compatible with our system.

## 2020-07-26 NOTE — ED ADULT NURSE NOTE - CHPI ED NUR SYMPTOMS NEG
no vomiting/no tingling/no nausea/no fever/no weakness/no chills/no decreased eating/drinking/no dizziness

## 2020-07-26 NOTE — ED PROVIDER NOTE - NS ED ROS FT
Review of Systems:  CONSTITUTIONAL: No fever, No diaphoresis  SKIN: No rash  HEMATOLOGIC: No abnormal bleeding or bruising  ENT: No sore throat, No neck pain, No rhinorrhea  RESPIRATORY: No shortness of breath, No cough  CARDIAC: No chest pain, No palpitations  GI: + abdominal pain, No nausea, No vomiting, No diarrhea, No constipation  : + dysuria, + frequency, No hematuria.   MUSCULOSKELETAL: No joint paint, No swelling, + back pain  NEUROLOGIC: No numbness, No focal weakness, No headache, No dizziness  All other systems negative, unless specified in HPI

## 2020-07-26 NOTE — ED ADULT NURSE NOTE - OBJECTIVE STATEMENT
pt states she was recently diagnosed with UTI, still experiencing symptoms although on abx, denies fever, n/v/d, chill, sweats

## 2020-07-26 NOTE — ED PROVIDER NOTE - OBJECTIVE STATEMENT
Pt is a 39 yo F with PMH of UTI 2 weeks ago, hx of nephrolithiasis, b/r tubal ligation 10 years ago presenting with dysuria and suprapubic pain x1 day. Pt is a 39 yo F with PMH of UTI 2 weeks ago, hx of nephrolithiasis, b/r tubal ligation 10 years ago presenting with dysuria and suprapubic pain x1 day.  Pt was seen here 2 weeks ago and discharged for UTI with Cefuroxime.  Pt completed 6 days of 7 day course.  States symptoms improved significantly.  Dysuria returned past few days but woke up this morning at 1 AM with severe pain with urination, which brought her to ED today.  Mild lower back pain.  No colicky pain.  Suprapubic pain is sharp and nonradiating. Pt also endorses recent "thick" white malodorous discharge.  No hx of STDs.  Monogamous.  Sexually active without protection.  Tubal ligation 10 years ago.  Pt denies hematuria, n/v, fevers, chills, weakness.

## 2020-07-26 NOTE — ED PROVIDER NOTE - NSFOLLOWUPINSTRUCTIONS_ED_ALL_ED_FT
BV  Bacterial Vaginosis    Bacterial vaginosis is a vaginal infection that occurs when the normal balance of bacteria in the vagina is disrupted. It results from an overgrowth of certain bacteria. This is the most common vaginal infection in women of childbearing age. Treatment is important to prevent complications, especially in pregnant women, as it can cause a premature delivery.     CAUSES  Bacterial vaginosis is caused by an increase in harmful bacteria that are normally present in smaller amounts in the vagina. Several different kinds of bacteria can cause bacterial vaginosis. However, the reason that the condition develops is not fully understood.    RISK FACTORS  Certain activities or behaviors can put you at an increased risk of developing bacterial vaginosis, including:    Having a new sex partner or multiple sex partners.   Douching.   Using an intrauterine device (IUD) for contraception.     Women do not get bacterial vaginosis from toilet seats, bedding, swimming pools, or contact with objects around them.    SIGNS AND SYMPTOMS  Some women with bacterial vaginosis have no signs or symptoms. Common symptoms include:    Grey vaginal discharge.  A fishlike odor with discharge, especially after sexual intercourse.  Itching or burning of the vagina and vulva.  Burning or pain with urination.    DIAGNOSIS  Your health care provider will take a medical history and examine the vagina for signs of bacterial vaginosis. A sample of vaginal fluid may be taken. Your health care provider will look at this sample under a microscope to check for bacteria and abnormal cells. A vaginal pH test may also be done.     TREATMENT  Bacterial vaginosis may be treated with antibiotic medicines. These may be given in the form of a pill or a vaginal cream. A second round of antibiotics may be prescribed if the condition comes back after treatment. Because bacterial vaginosis increases your risk for sexually transmitted diseases, getting treated can help reduce your risk for chlamydia, gonorrhea, HIV, and herpes.    HOME CARE INSTRUCTIONS  Only take over-the-counter or prescription medicines as directed by your health care provider.  If antibiotic medicine was prescribed, take it as directed. Make sure you finish it even if you start to feel better.  Tell all sexual partners that you have a vaginal infection. They should see their health care provider and be treated if they have problems, such as a mild rash or itching.   During treatment, it is important that you follow these instructions:   Avoid sexual activity or use condoms correctly.  Do not douche.  Avoid alcohol as directed by your health care provider.  Avoid breastfeeding as directed by your health care provider.    SEEK MEDICAL CARE IF:  Your symptoms are not improving after 3 days of treatment.  You have increased discharge or pain.  You have a fever.    MAKE SURE YOU:  Understand these instructions.   Will watch your condition.  Will get help right away if you are not doing well or get worse.    FOR MORE INFORMATION  Centers for Disease Control and Prevention, Division of STD Prevention: www.cdc.gov/std    American Sexual Health Association ([place]): www.ashastd.org     ADDITIONAL NOTES AND INSTRUCTIONS    Please follow up with your Primary MD in 24-48 hr.  Seek immediate medical care for any new/worsening signs or symptoms.       ----------------------------      Urinary Tract Infection, Adult  A urinary tract infection (UTI) is an infection of any part of the urinary tract, which includes the kidneys, ureters, bladder, and urethra. These organs make, store, and get rid of urine in the body. UTI can be a bladder infection (cystitis) or kidney infection (pyelonephritis).    What are the causes?  This infection may be caused by fungi, viruses, or bacteria. Bacteria are the most common cause of UTIs. This condition can also be caused by repeated incomplete emptying of the bladder during urination.    What increases the risk?  This condition is more likely to develop if:    You ignore your need to urinate or hold urine for long periods of time.  You do not empty your bladder completely during urination.  You wipe back to front after urinating or having a bowel movement, if you are female.  You are uncircumcised, if you are male.  You are constipated.  You have a urinary catheter that stays in place (indwelling).  You have a weak defense (immune) system.  You have a medical condition that affects your bowels, kidneys, or bladder.  You have diabetes.  You take antibiotic medicines frequently or for long periods of time, and the antibiotics no longer work well against certain types of infections (antibiotic resistance).  You take medicines that irritate your urinary tract.  You are exposed to chemicals that irritate your urinary tract.  You are female.    What are the signs or symptoms?  Symptoms of this condition include:    Fever.  Frequent urination or passing small amounts of urine frequently.  Needing to urinate urgently.  Pain or burning with urination.  Urine that smells bad or unusual.  Cloudy urine.  Pain in the lower abdomen or back.  Trouble urinating.  Blood in the urine.  Vomiting or being less hungry than normal.  Diarrhea or abdominal pain.  Vaginal discharge, if you are female.    How is this diagnosed?  This condition is diagnosed with a medical history and physical exam. You will also need to provide a urine sample to test your urine. Other tests may be done, including:    Blood tests.  Sexually transmitted disease (STD) testing.    If you have had more than one UTI, a cystoscopy or imaging studies may be done to determine the cause of the infections.    How is this treated?  Treatment for this condition often includes a combination of two or more of the following:    Antibiotic medicine.  Other medicines to treat less common causes of UTI.  Over-the-counter medicines to treat pain.  Drinking enough water to stay hydrated.    Follow these instructions at home:  Take over-the-counter and prescription medicines only as told by your health care provider.  If you were prescribed an antibiotic, take it as told by your health care provider. Do not stop taking the antibiotic even if you start to feel better.  Avoid alcohol, caffeine, tea, and carbonated beverages. They can irritate your bladder.  Drink enough fluid to keep your urine clear or pale yellow.  Keep all follow-up visits as told by your health care provider. This is important.  ImageMake sure to:    Empty your bladder often and completely. Do not hold urine for long periods of time.  Empty your bladder before and after sex.  Wipe from front to back after a bowel movement if you are female. Use each tissue one time when you wipe.    Contact a health care provider if:  You have back pain.  You have a fever.  You feel nauseous or vomit.  Your symptoms do not get better after 3 days.  Your symptoms go away and then return.  Get help right away if:  You have severe back pain or lower abdominal pain.  You are vomiting and cannot keep down any medicines or water.  This information is not intended to replace advice given to you by your health care provider. Make sure you discuss any questions you have with your health care provider.

## 2020-07-26 NOTE — ED PROVIDER NOTE - PHYSICAL EXAMINATION
CONSTITUTIONAL: Well-developed; well-nourished; in no acute distress.   SKIN: warm, dry  HEAD: Normocephalic; atraumatic.  EYES: no conjunctival injection. EOMI.   ENT: No nasal discharge; airway clear.  CARD: Regular rate and rhythm.   RESP: No wheezes, rales or rhonchi.  ABD: soft, severe TTP of suprapubic region with mild TTP to RLQ and LLQ; no guarding, no rebound tenderness; mild flank pain on R; No CVA tenderness  : Presence of moderately watery to thick white discharge; scant gross blood observed; Normal appearing female genitalia, no masses/lesions. No Cervical motion tenderness  EXT: Normal ROM.  No clubbing, cyanosis or edema  NEURO: Alert, oriented, grossly unremarkable.  PSYCH: Cooperative, appropriate.

## 2020-07-26 NOTE — ED PROVIDER NOTE - PROGRESS NOTE DETAILS
AH - patient tolerated  exam well.  White moderately viscous discharge noted in vaginal vault, scant gross blood.  GC collected AH - pt ambulating well.  States she wants to go home and doing well

## 2020-07-26 NOTE — ED PROVIDER NOTE - CLINICAL SUMMARY MEDICAL DECISION MAKING FREE TEXT BOX
evaluated for suprapubic pain with vaginal discharge. vaginal discharge consistent with BV so treated with flaygl. suprapubic pain and UA consistent with UTI. given prior culture results will treat with bactrim.

## 2020-07-26 NOTE — ED PROVIDER NOTE - ATTENDING CONTRIBUTION TO CARE
3 days of increased suprapubic abd pain associated with dysuria, no fevers, with thin clear/whitish discharge. she is sexaully active only 1 partner. patient treated for uti 2 weeks ago that was positive for ecoli with nml ct scan. on exam there is suprapubic tenderness without rlq or llq tendernss, no cva tendnerss, pelvic exam as per above. plan is to obtain pelvic us, repeat labs.

## 2020-07-27 LAB
CULTURE RESULTS: SIGNIFICANT CHANGE UP
SPECIMEN SOURCE: SIGNIFICANT CHANGE UP

## 2020-08-18 NOTE — ED ADULT NURSE NOTE - PAIN: PRESENCE, MLM
[FreeTextEntry1] : Patient seen and examined at home.  Patient is homebound 2/2 generalized weakness/debility and AD.  Dtr present during visit.  After last visit, patient evaluated by and admitted to Hospice.  Dtr reports is happy with the care, medications adjusted.  Patient also declining, decreased PO intake.  Currently receiving Hospice medications.  
complains of pain/discomfort

## 2020-08-24 NOTE — ED ADULT NURSE NOTE - EXTENSIONS OF SELF_ADULT
None Eval with STEPHANIE Cuevas. Pt presents bloody diarrhea/BRBPR. He had dizziness with reported waxing/waning consciousness when EMS arrived; Based on history and exam; concern for GI bleed, pt is hemodynamically stable (he states he feels better after the IV that EMS started), stool positive for occult blood - will admit for scope;  Pt also has decrease in HgB from 15 to 11. He has rise in creatinine from normal 1.1 to >2. Pt to be admitted for workup and eval.  I performed a face to face bedside interview with patient regarding history of present illness, review of symptoms and past medical history. I completed an independent physical exam.  I have discussed the patient's plan of care with Physician Assistant (PA). I agree with note as stated above, having amended the EMR as needed to reflect my findings.   This includes History of Present Illness, HIV, Past Medical/Surgical/Family/Social History, Allergies and Home Medications, Review of Systems, Physical Exam, and any Progress Notes during the time I functioned as the attending physician for this patient.

## 2020-10-22 ENCOUNTER — APPOINTMENT (OUTPATIENT)
Dept: OBGYN | Facility: CLINIC | Age: 40
End: 2020-10-22
Payer: MEDICAID

## 2020-10-22 ENCOUNTER — OUTPATIENT (OUTPATIENT)
Dept: OUTPATIENT SERVICES | Facility: HOSPITAL | Age: 40
LOS: 1 days | Discharge: HOME | End: 2020-10-22

## 2020-10-22 ENCOUNTER — RESULT CHARGE (OUTPATIENT)
Age: 40
End: 2020-10-22

## 2020-10-22 VITALS
BODY MASS INDEX: 31.41 KG/M2 | HEIGHT: 60 IN | SYSTOLIC BLOOD PRESSURE: 122 MMHG | WEIGHT: 160 LBS | DIASTOLIC BLOOD PRESSURE: 80 MMHG

## 2020-10-22 DIAGNOSIS — N83.8 OTHER NONINFLAMMATORY DISORDERS OF OVARY, FALLOPIAN TUBE AND BROAD LIGAMENT: ICD-10-CM

## 2020-10-22 DIAGNOSIS — Z01.419 ENCOUNTER FOR GYNECOLOGICAL EXAMINATION (GENERAL) (ROUTINE) W/OUT ABNORMAL FINDINGS: ICD-10-CM

## 2020-10-22 DIAGNOSIS — N89.8 OTHER SPECIFIED NONINFLAMMATORY DISORDERS OF VAGINA: ICD-10-CM

## 2020-10-22 DIAGNOSIS — Z87.42 PERSONAL HISTORY OF OTHER DISEASES OF THE FEMALE GENITAL TRACT: ICD-10-CM

## 2020-10-22 DIAGNOSIS — Z86.018 PERSONAL HISTORY OF OTHER BENIGN NEOPLASM: ICD-10-CM

## 2020-10-22 DIAGNOSIS — N39.46 MIXED INCONTINENCE: ICD-10-CM

## 2020-10-22 DIAGNOSIS — N81.6 RECTOCELE: ICD-10-CM

## 2020-10-22 LAB
BILIRUB UR QL STRIP: NORMAL
CLARITY UR: CLEAR
COLLECTION METHOD: NORMAL
GLUCOSE UR-MCNC: NORMAL
HCG UR QL: 0.2 EU/DL
HGB UR QL STRIP.AUTO: NORMAL
KETONES UR-MCNC: NORMAL
LEUKOCYTE ESTERASE UR QL STRIP: NORMAL
NITRITE UR QL STRIP: NORMAL
PH UR STRIP: 6
PROT UR STRIP-MCNC: NORMAL
SP GR UR STRIP: 1

## 2020-10-22 PROCEDURE — 99396 PREV VISIT EST AGE 40-64: CPT

## 2020-10-24 PROBLEM — Z01.419 WOMEN'S ANNUAL ROUTINE GYNECOLOGICAL EXAMINATION: Status: ACTIVE | Noted: 2020-10-22

## 2020-10-24 PROBLEM — N83.8 PARATUBAL CYST: Status: RESOLVED | Noted: 2017-06-06 | Resolved: 2020-10-24

## 2020-10-24 PROBLEM — N81.6 RECTOCELE, FEMALE: Status: RESOLVED | Noted: 2018-11-23 | Resolved: 2020-10-24

## 2020-10-24 NOTE — HISTORY OF PRESENT ILLNESS
[FreeTextEntry1] : 40y for annual exam. Has not seen a GYN since 2018. \par \par Reports vaginal burning, itching and white thick discharge after her periods and after intercourse that have been bothersome. This problem started 3 months ago.\par \par Sexually active with male partner () s/p tubal ligation\par Periods are regular q 28days last for 7 days, unsure when last one started. \par Last pap smear 2018 NILM, HPV neg\par Follows with urogyn s/p TVT and posterior colporrhaphy and perineorraphy in 2019

## 2020-10-31 LAB
A VAGINAE DNA VAG QL NAA+PROBE: NORMAL
BVAB2 DNA VAG QL NAA+PROBE: NORMAL
C KRUSEI DNA VAG QL NAA+PROBE: NEGATIVE
C KRUSEI DNA VAG QL NAA+PROBE: POSITIVE
C TRACH RRNA SPEC QL NAA+PROBE: NEGATIVE
MEGA1 DNA VAG QL NAA+PROBE: NORMAL
N GONORRHOEA RRNA SPEC QL NAA+PROBE: NEGATIVE
T VAGINALIS RRNA SPEC QL NAA+PROBE: NEGATIVE

## 2020-11-04 ENCOUNTER — APPOINTMENT (OUTPATIENT)
Dept: UROGYNECOLOGY | Facility: CLINIC | Age: 40
End: 2020-11-04
Payer: MEDICAID

## 2020-11-04 ENCOUNTER — OUTPATIENT (OUTPATIENT)
Dept: OUTPATIENT SERVICES | Facility: HOSPITAL | Age: 40
LOS: 1 days | Discharge: HOME | End: 2020-11-04

## 2020-11-04 VITALS — HEIGHT: 60 IN | SYSTOLIC BLOOD PRESSURE: 108 MMHG | DIASTOLIC BLOOD PRESSURE: 78 MMHG

## 2020-11-04 DIAGNOSIS — M79.10 MYALGIA, UNSPECIFIED SITE: ICD-10-CM

## 2020-11-04 DIAGNOSIS — K59.00 CONSTIPATION, UNSPECIFIED: ICD-10-CM

## 2020-11-04 PROCEDURE — 99213 OFFICE O/P EST LOW 20 MIN: CPT | Mod: 25

## 2020-11-04 PROCEDURE — 51701 INSERT BLADDER CATHETER: CPT

## 2020-11-05 LAB
APPEARANCE: CLEAR
BILIRUBIN URINE: NEGATIVE
BLOOD URINE: NEGATIVE
COLOR: COLORLESS
GLUCOSE QUALITATIVE U: NEGATIVE
KETONES URINE: NEGATIVE
LEUKOCYTE ESTERASE URINE: NEGATIVE
NITRITE URINE: NEGATIVE
PH URINE: 6.5
PROTEIN URINE: NEGATIVE
SPECIFIC GRAVITY URINE: 1.01
UROBILINOGEN URINE: NORMAL

## 2020-11-05 NOTE — DISCUSSION/SUMMARY
[FreeTextEntry1] : Myalgia\par Rx: baclofen 10mg BID #60 (1R)\par \par Constipation:\par Bowel recipe, Miralax, Colace, Dulcolax, all prn\par \par Urine culture obtained.\par Will follow up.\par Will treat accordingly if necessary\par \par F/u 6 weeks

## 2020-11-05 NOTE — PHYSICAL EXAM
[Chaperone Present] : A chaperone was present in the examining room during all aspects of the physical examination [No Acute Distress] : in no acute distress [Well developed] : well developed [Well Nourished] : ~L well nourished [FreeTextEntry1] : Urethra was prepped in sterile fashion and then a sterile catheter was used by me to drain the bladder.\par void: 450cc\par cath: 150cc (normal 200 or less)

## 2020-11-05 NOTE — COUNSELING
[FreeTextEntry1] : We will contact you if the urine results are abnormal.\par If you feel like you have an infection it is important for you to call our office and we will arrange testing of your urine.\par \par Please begin taking Baclofen 10mg twice a day. It takes up to 6 weeks to go into full effect. Please  your refill when you complete the 1st bottle.\par \par Please take Colace, or Dulcolax or Miralax as needed for constipation.\par Please use bowel recipe for constipation.\par \par Please call my office if you have any issues with the cost or side effects of the medication. \par \par Schedule a 6 weeks follow up med check appointment.\par

## 2020-11-05 NOTE — HISTORY OF PRESENT ILLNESS
[FreeTextEntry1] : Pt is here for follow up. \par Last seen on 6/12/2019 for 6 weeks med check for urge incontinence. \par \par s/p TVT.cysto/posterior colporrhaphy and perineorrhaphy 3/19/19\par \par Baclofen 20 mg BID for myalgia\par Vesicare 5 mg daily mixed incontinence\par \par Today pt states that she's c/o feeling constipated and c/o abdominal bloating. Pt saw her gyn Dr Martin, had pelvic sono in July 2020 and it showed ovarian cysts. Pt states that she was in the ER few months ago for urine infection. Pt also had CT scan done of abdomen/pelvis: neg. Denies frequency of urination. Pt c/o painful intercourse and occasional postcoital bleeding. \par Pt stopped all her medications because she ran out of them.

## 2020-11-08 LAB — BACTERIA UR CULT: ABNORMAL

## 2020-11-08 RX ORDER — NITROFURANTOIN (MONOHYDRATE/MACROCRYSTALS) 25; 75 MG/1; MG/1
100 CAPSULE ORAL TWICE DAILY
Qty: 14 | Refills: 0 | Status: COMPLETED | COMMUNITY
Start: 2020-11-08 | End: 2020-11-15

## 2020-11-10 RX ORDER — FLUCONAZOLE 150 MG/1
150 TABLET ORAL
Qty: 1 | Refills: 0 | Status: DISCONTINUED | COMMUNITY
Start: 2019-06-12 | End: 2020-11-10

## 2020-11-10 RX ORDER — BACLOFEN 20 MG/1
20 TABLET ORAL
Qty: 60 | Refills: 1 | Status: ACTIVE | COMMUNITY
Start: 2020-11-04 | End: 1900-01-01

## 2020-11-10 RX ORDER — FLUCONAZOLE 150 MG/1
150 TABLET ORAL
Qty: 1 | Refills: 1 | Status: DISCONTINUED | COMMUNITY
Start: 2020-10-22 | End: 2020-11-10

## 2021-01-13 ENCOUNTER — APPOINTMENT (OUTPATIENT)
Dept: UROGYNECOLOGY | Facility: CLINIC | Age: 41
End: 2021-01-13

## 2021-05-31 ENCOUNTER — EMERGENCY (EMERGENCY)
Facility: HOSPITAL | Age: 41
LOS: 0 days | Discharge: HOME | End: 2021-05-31
Attending: EMERGENCY MEDICINE | Admitting: EMERGENCY MEDICINE
Payer: MEDICAID

## 2021-05-31 VITALS
HEART RATE: 79 BPM | OXYGEN SATURATION: 97 % | TEMPERATURE: 98 F | HEIGHT: 57 IN | RESPIRATION RATE: 79 BRPM | WEIGHT: 149.91 LBS | SYSTOLIC BLOOD PRESSURE: 140 MMHG | DIASTOLIC BLOOD PRESSURE: 81 MMHG

## 2021-05-31 VITALS
RESPIRATION RATE: 17 BRPM | DIASTOLIC BLOOD PRESSURE: 72 MMHG | SYSTOLIC BLOOD PRESSURE: 132 MMHG | OXYGEN SATURATION: 98 % | HEART RATE: 71 BPM

## 2021-05-31 DIAGNOSIS — N83.209 UNSPECIFIED OVARIAN CYST, UNSPECIFIED SIDE: ICD-10-CM

## 2021-05-31 DIAGNOSIS — R10.32 LEFT LOWER QUADRANT PAIN: ICD-10-CM

## 2021-05-31 DIAGNOSIS — N76.0 ACUTE VAGINITIS: ICD-10-CM

## 2021-05-31 DIAGNOSIS — Z79.899 OTHER LONG TERM (CURRENT) DRUG THERAPY: ICD-10-CM

## 2021-05-31 DIAGNOSIS — L29.2 PRURITUS VULVAE: ICD-10-CM

## 2021-05-31 DIAGNOSIS — Z87.442 PERSONAL HISTORY OF URINARY CALCULI: ICD-10-CM

## 2021-05-31 DIAGNOSIS — R09.89 OTHER SPECIFIED SYMPTOMS AND SIGNS INVOLVING THE CIRCULATORY AND RESPIRATORY SYSTEMS: ICD-10-CM

## 2021-05-31 LAB
ALBUMIN SERPL ELPH-MCNC: 4.5 G/DL — SIGNIFICANT CHANGE UP (ref 3.5–5.2)
ALP SERPL-CCNC: 76 U/L — SIGNIFICANT CHANGE UP (ref 30–115)
ALT FLD-CCNC: 26 U/L — SIGNIFICANT CHANGE UP (ref 0–41)
ANION GAP SERPL CALC-SCNC: 10 MMOL/L — SIGNIFICANT CHANGE UP (ref 7–14)
APPEARANCE UR: CLEAR — SIGNIFICANT CHANGE UP
AST SERPL-CCNC: 19 U/L — SIGNIFICANT CHANGE UP (ref 0–41)
BACTERIA # UR AUTO: NEGATIVE — SIGNIFICANT CHANGE UP
BASOPHILS # BLD AUTO: 0.08 K/UL — SIGNIFICANT CHANGE UP (ref 0–0.2)
BASOPHILS NFR BLD AUTO: 0.8 % — SIGNIFICANT CHANGE UP (ref 0–1)
BILIRUB SERPL-MCNC: <0.2 MG/DL — SIGNIFICANT CHANGE UP (ref 0.2–1.2)
BILIRUB UR-MCNC: NEGATIVE — SIGNIFICANT CHANGE UP
BUN SERPL-MCNC: 10 MG/DL — SIGNIFICANT CHANGE UP (ref 10–20)
CALCIUM SERPL-MCNC: 9.3 MG/DL — SIGNIFICANT CHANGE UP (ref 8.5–10.1)
CHLORIDE SERPL-SCNC: 105 MMOL/L — SIGNIFICANT CHANGE UP (ref 98–110)
CO2 SERPL-SCNC: 23 MMOL/L — SIGNIFICANT CHANGE UP (ref 17–32)
COLOR SPEC: COLORLESS — SIGNIFICANT CHANGE UP
CREAT SERPL-MCNC: 0.7 MG/DL — SIGNIFICANT CHANGE UP (ref 0.7–1.5)
DIFF PNL FLD: SIGNIFICANT CHANGE UP
EOSINOPHIL # BLD AUTO: 0.4 K/UL — SIGNIFICANT CHANGE UP (ref 0–0.7)
EOSINOPHIL NFR BLD AUTO: 4.2 % — SIGNIFICANT CHANGE UP (ref 0–8)
EPI CELLS # UR: 2 /HPF — SIGNIFICANT CHANGE UP (ref 0–5)
GLUCOSE SERPL-MCNC: 92 MG/DL — SIGNIFICANT CHANGE UP (ref 70–99)
GLUCOSE UR QL: NEGATIVE — SIGNIFICANT CHANGE UP
HCG SERPL QL: NEGATIVE — SIGNIFICANT CHANGE UP
HCT VFR BLD CALC: 38.4 % — SIGNIFICANT CHANGE UP (ref 37–47)
HGB BLD-MCNC: 12.9 G/DL — SIGNIFICANT CHANGE UP (ref 12–16)
HYALINE CASTS # UR AUTO: 0 /LPF — SIGNIFICANT CHANGE UP (ref 0–7)
IMM GRANULOCYTES NFR BLD AUTO: 0.2 % — SIGNIFICANT CHANGE UP (ref 0.1–0.3)
KETONES UR-MCNC: NEGATIVE — SIGNIFICANT CHANGE UP
LEUKOCYTE ESTERASE UR-ACNC: ABNORMAL
LIDOCAIN IGE QN: 40 U/L — SIGNIFICANT CHANGE UP (ref 7–60)
LYMPHOCYTES # BLD AUTO: 3.1 K/UL — SIGNIFICANT CHANGE UP (ref 1.2–3.4)
LYMPHOCYTES # BLD AUTO: 32.7 % — SIGNIFICANT CHANGE UP (ref 20.5–51.1)
MAGNESIUM SERPL-MCNC: 2.1 MG/DL — SIGNIFICANT CHANGE UP (ref 1.8–2.4)
MCHC RBC-ENTMCNC: 30.4 PG — SIGNIFICANT CHANGE UP (ref 27–31)
MCHC RBC-ENTMCNC: 33.6 G/DL — SIGNIFICANT CHANGE UP (ref 32–37)
MCV RBC AUTO: 90.6 FL — SIGNIFICANT CHANGE UP (ref 81–99)
MONOCYTES # BLD AUTO: 0.96 K/UL — HIGH (ref 0.1–0.6)
MONOCYTES NFR BLD AUTO: 10.1 % — HIGH (ref 1.7–9.3)
NEUTROPHILS # BLD AUTO: 4.93 K/UL — SIGNIFICANT CHANGE UP (ref 1.4–6.5)
NEUTROPHILS NFR BLD AUTO: 52 % — SIGNIFICANT CHANGE UP (ref 42.2–75.2)
NITRITE UR-MCNC: NEGATIVE — SIGNIFICANT CHANGE UP
NRBC # BLD: 0 /100 WBCS — SIGNIFICANT CHANGE UP (ref 0–0)
PH UR: 6.5 — SIGNIFICANT CHANGE UP (ref 5–8)
PLATELET # BLD AUTO: 314 K/UL — SIGNIFICANT CHANGE UP (ref 130–400)
POTASSIUM SERPL-MCNC: 4.2 MMOL/L — SIGNIFICANT CHANGE UP (ref 3.5–5)
POTASSIUM SERPL-SCNC: 4.2 MMOL/L — SIGNIFICANT CHANGE UP (ref 3.5–5)
PROT SERPL-MCNC: 7.2 G/DL — SIGNIFICANT CHANGE UP (ref 6–8)
PROT UR-MCNC: NEGATIVE — SIGNIFICANT CHANGE UP
RBC # BLD: 4.24 M/UL — SIGNIFICANT CHANGE UP (ref 4.2–5.4)
RBC # FLD: 13.8 % — SIGNIFICANT CHANGE UP (ref 11.5–14.5)
RBC CASTS # UR COMP ASSIST: 4 /HPF — SIGNIFICANT CHANGE UP (ref 0–4)
SODIUM SERPL-SCNC: 138 MMOL/L — SIGNIFICANT CHANGE UP (ref 135–146)
SP GR SPEC: 1 — LOW (ref 1.01–1.03)
UROBILINOGEN FLD QL: SIGNIFICANT CHANGE UP
WBC # BLD: 9.49 K/UL — SIGNIFICANT CHANGE UP (ref 4.8–10.8)
WBC # FLD AUTO: 9.49 K/UL — SIGNIFICANT CHANGE UP (ref 4.8–10.8)
WBC UR QL: 13 /HPF — HIGH (ref 0–5)

## 2021-05-31 PROCEDURE — 99284 EMERGENCY DEPT VISIT MOD MDM: CPT

## 2021-05-31 PROCEDURE — 76830 TRANSVAGINAL US NON-OB: CPT | Mod: 26

## 2021-05-31 PROCEDURE — 74177 CT ABD & PELVIS W/CONTRAST: CPT | Mod: 26,MA

## 2021-05-31 RX ORDER — KETOROLAC TROMETHAMINE 30 MG/ML
15 SYRINGE (ML) INJECTION ONCE
Refills: 0 | Status: DISCONTINUED | OUTPATIENT
Start: 2021-05-31 | End: 2021-05-31

## 2021-05-31 RX ORDER — SODIUM CHLORIDE 9 MG/ML
1000 INJECTION INTRAMUSCULAR; INTRAVENOUS; SUBCUTANEOUS ONCE
Refills: 0 | Status: COMPLETED | OUTPATIENT
Start: 2021-05-31 | End: 2021-05-31

## 2021-05-31 RX ORDER — FLUCONAZOLE 150 MG/1
150 TABLET ORAL ONCE
Refills: 0 | Status: DISCONTINUED | OUTPATIENT
Start: 2021-05-31 | End: 2021-05-31

## 2021-05-31 RX ADMIN — SODIUM CHLORIDE 1000 MILLILITER(S): 9 INJECTION INTRAMUSCULAR; INTRAVENOUS; SUBCUTANEOUS at 16:27

## 2021-05-31 RX ADMIN — Medication 15 MILLIGRAM(S): at 16:28

## 2021-05-31 NOTE — ED PROVIDER NOTE - PATIENT PORTAL LINK FT
You can access the FollowMyHealth Patient Portal offered by [facility] by registering at the following website: [email or web address]. By joining [facility]’s FollowMyHealth portal, you will also be able to view your health information using other applications (apps) compatible with our system.

## 2021-05-31 NOTE — ED PROVIDER NOTE - OBJECTIVE STATEMENT
41y F pmh fibroids, ovarian cyst, UTI presents for eval of LLQ pain. Pt has intermittent mild aching LLQ pain x2 days, associated vaginal itching and dysuria, no relieving factors. Denies fever, ha, cp, sob, weakness, numbness, hematuria, dc, bleeding, n/v/d/c

## 2021-05-31 NOTE — ED ADULT NURSE NOTE - TEMPLATE
"Chief Complaint   Patient presents with     Recheck Medication     valtrex, retin A, metronidazole       Initial /60 mmHg  Pulse 64  Temp(Src) 95.2  F (35.1  C) (Oral)  Resp 16 Estimated body mass index is 25.87 kg/(m^2) as calculated from the following:    Height as of 4/12/16: 5' 5.1\" (1.654 m).    Weight as of 7/26/16: 156 lb (70.761 kg).  BP completed using cuff size: uvaldo STARKS LPN    Patient declined weight and height today.  " Abdominal Pain, N/V/D

## 2021-05-31 NOTE — ED PROVIDER NOTE - ATTENDING CONTRIBUTION TO CARE
40 y/o female h/o nephrolithiasis, BTL p/w lower abdominal pain x 2 days, persistent, radiates to left lower back, denies modifying factors, + dysuria, passing stool and flatus, denies fever, n/v/d, anorexia, cough, respiratory sx, change in bowel habits or urinary sx, vaginal d/c or other associated complaints at present.     Old chart reviewed.  I have reviewed and agree with the initial nursing note, except as documented in my note.    VSS, awake, alert, non-toxic appearing, no scleral icterus, oropharynx clear, mmm, no jaundice, skin rash or lesions, chest CTAB, non-labored breathing, no w/r/r, +S1/S2, RRR, no m/r/g, abdomen soft, NT, ND, +BS, no hernias or distention, no pulsatile masses or bruits appreciated, no CVA tenderness, no peripheral edema or deformities, alert, clear speech and steady gait.

## 2021-05-31 NOTE — ED PROVIDER NOTE - NSFOLLOWUPCLINICS_GEN_ALL_ED_FT
[facility]  OB/GYN  [address] [place] [address]  Phone: [phone]  Fax:

## 2021-05-31 NOTE — ED PROVIDER NOTE - PROGRESS NOTE DETAILS
BH: Prior to DC patient's labs, CT, US WNL, no acute intra-abdominal process identified at this time, abdomen soft, non-tender. No indication for admission or further emergent evaluation at this time. Was explained that the source of their symptoms is unclear and that the patient should still follow up with their primary physician or gastroenterology for further evaluation and management. These elements were discussed with the patient and they are amenable to outpatient follow-up at this time.    The patient was given detailed return precautions and advised to return to the emergency department in 2-3 days if not improving or sooner if any new symptoms develop, symptoms worsened or for any concerns. The patient was offered the opportunity to ask questions and verbalized that they understand the diagnosis and discharge instructions. BH: pt called in FU, still with urinary sx, will give trial PO ABX for UTI due to equivocal UA and persistent sx.

## 2021-05-31 NOTE — ED PROVIDER NOTE - PHYSICAL EXAMINATION
CONST: NAD  EYES: Sclera and conjunctiva clear.   ENT: No nasal discharge. Oropharynx normal appearing  NECK: Non-tender, no meningeal signs. normal ROM. supple   CARD: S1 S2; No jvd  RESP: Equal BS B/L, No wheezes, rhonchi or rales. No distress  GI: LLQ tenderness. Soft, non-distended. no cva tenderness. normal BS  : external wnl, atrophic vaginal canal. No CMT  MS: Normal ROM in all extremities. pulses 2 +. no calf tenderness or swelling  SKIN: Warm, dry, no acute rashes. Good turgor  NEURO: A&Ox3, No focal deficits. Strength 5/5 with no sensory deficits. Steady gait.

## 2021-05-31 NOTE — ED PROVIDER NOTE - NS ED ROS FT
Constitutional: (-) fever  Eyes/ENT: (-) blurry vision, (-) epistaxis  Cardiovascular: (-) chest pain, (-) syncope  Respiratory: (-) cough, (-) shortness of breath  Gastrointestinal: (+) LLQ pain, (-) vomiting, (-) diarrhea  : (+) dysuria, (+) vaginal discomfort, (-) hematuria  Musculoskeletal: (-) neck pain, (-) back pain, (-) joint pain  Integumentary: (-) rash, (-) edema  Neurological: (-) headache, (-) altered mental status  Allergic/Immunologic: (-) pruritus

## 2021-06-01 LAB
CULTURE RESULTS: SIGNIFICANT CHANGE UP
SPECIMEN SOURCE: SIGNIFICANT CHANGE UP

## 2021-06-01 RX ORDER — CEPHALEXIN 500 MG
1 CAPSULE ORAL
Qty: 14 | Refills: 0
Start: 2021-06-01 | End: 2021-06-07

## 2021-06-09 RX ORDER — FLUCONAZOLE 150 MG/1
150 TABLET ORAL
Qty: 2 | Refills: 1 | Status: ACTIVE | COMMUNITY
Start: 2020-11-10 | End: 1900-01-01

## 2021-06-09 RX ORDER — TERCONAZOLE 8 MG/G
0.8 CREAM VAGINAL
Qty: 1 | Refills: 3 | Status: ACTIVE | COMMUNITY
Start: 2020-10-22 | End: 1900-01-01

## 2021-10-28 ENCOUNTER — APPOINTMENT (OUTPATIENT)
Dept: OBGYN | Facility: CLINIC | Age: 41
End: 2021-10-28

## 2022-06-03 NOTE — ED ADULT NURSE NOTE - NSFALLRSKUNASSIST_ED_ALL_ED
no Pt is a 72 year old female who presented to [facility] s/p fall.  found to have CT Lumbar Spine showing L1 endplate fracture, evaluated by Neurosurgery who recommended no acute surgical intervention and TLSO brace. PMH: left foot drop, detailed PMH below.

## 2023-07-14 ENCOUNTER — INPATIENT (INPATIENT)
Facility: HOSPITAL | Age: 43
LOS: 3 days | Discharge: HOME | DRG: 244 | End: 2023-07-18
Attending: STUDENT IN AN ORGANIZED HEALTH CARE EDUCATION/TRAINING PROGRAM | Admitting: STUDENT IN AN ORGANIZED HEALTH CARE EDUCATION/TRAINING PROGRAM
Payer: MEDICAID

## 2023-07-14 VITALS
TEMPERATURE: 98 F | RESPIRATION RATE: 18 BRPM | HEART RATE: 79 BPM | SYSTOLIC BLOOD PRESSURE: 144 MMHG | WEIGHT: 165.79 LBS | DIASTOLIC BLOOD PRESSURE: 73 MMHG | OXYGEN SATURATION: 98 %

## 2023-07-14 DIAGNOSIS — K57.92 DIVERTICULITIS OF INTESTINE, PART UNSPECIFIED, WITHOUT PERFORATION OR ABSCESS WITHOUT BLEEDING: ICD-10-CM

## 2023-07-14 LAB
ALBUMIN SERPL ELPH-MCNC: 4.2 G/DL — SIGNIFICANT CHANGE UP (ref 3.5–5.2)
ALP SERPL-CCNC: 47 U/L — SIGNIFICANT CHANGE UP (ref 30–115)
ALT FLD-CCNC: 75 U/L — HIGH (ref 0–41)
ANION GAP SERPL CALC-SCNC: 5 MMOL/L — LOW (ref 7–14)
APPEARANCE UR: CLEAR — SIGNIFICANT CHANGE UP
AST SERPL-CCNC: 85 U/L — HIGH (ref 0–41)
BACTERIA # UR AUTO: NEGATIVE — SIGNIFICANT CHANGE UP
BASOPHILS # BLD AUTO: 0.07 K/UL — SIGNIFICANT CHANGE UP (ref 0–0.2)
BASOPHILS NFR BLD AUTO: 0.5 % — SIGNIFICANT CHANGE UP (ref 0–1)
BILIRUB SERPL-MCNC: 0.3 MG/DL — SIGNIFICANT CHANGE UP (ref 0.2–1.2)
BILIRUB UR-MCNC: NEGATIVE — SIGNIFICANT CHANGE UP
BUN SERPL-MCNC: 11 MG/DL — SIGNIFICANT CHANGE UP (ref 10–20)
CALCIUM SERPL-MCNC: 8.9 MG/DL — SIGNIFICANT CHANGE UP (ref 8.4–10.5)
CHLORIDE SERPL-SCNC: 101 MMOL/L — SIGNIFICANT CHANGE UP (ref 98–110)
CO2 SERPL-SCNC: 24 MMOL/L — SIGNIFICANT CHANGE UP (ref 17–32)
COLOR SPEC: COLORLESS — SIGNIFICANT CHANGE UP
CREAT SERPL-MCNC: 0.7 MG/DL — SIGNIFICANT CHANGE UP (ref 0.7–1.5)
DIFF PNL FLD: ABNORMAL
EGFR: 110 ML/MIN/1.73M2 — SIGNIFICANT CHANGE UP
EOSINOPHIL # BLD AUTO: 0.19 K/UL — SIGNIFICANT CHANGE UP (ref 0–0.7)
EOSINOPHIL NFR BLD AUTO: 1.2 % — SIGNIFICANT CHANGE UP (ref 0–8)
EPI CELLS # UR: 5 /HPF — SIGNIFICANT CHANGE UP (ref 0–5)
GLUCOSE SERPL-MCNC: 104 MG/DL — HIGH (ref 70–99)
GLUCOSE UR QL: NEGATIVE — SIGNIFICANT CHANGE UP
HCT VFR BLD CALC: 38 % — SIGNIFICANT CHANGE UP (ref 37–47)
HGB BLD-MCNC: 13 G/DL — SIGNIFICANT CHANGE UP (ref 12–16)
HYALINE CASTS # UR AUTO: 1 /LPF — SIGNIFICANT CHANGE UP (ref 0–7)
IMM GRANULOCYTES NFR BLD AUTO: 0.5 % — HIGH (ref 0.1–0.3)
KETONES UR-MCNC: NEGATIVE — SIGNIFICANT CHANGE UP
LACTATE SERPL-SCNC: 0.9 MMOL/L — SIGNIFICANT CHANGE UP (ref 0.7–2)
LEUKOCYTE ESTERASE UR-ACNC: ABNORMAL
LIDOCAIN IGE QN: 34 U/L — SIGNIFICANT CHANGE UP (ref 7–60)
LYMPHOCYTES # BLD AUTO: 15.8 % — LOW (ref 20.5–51.1)
LYMPHOCYTES # BLD AUTO: 2.45 K/UL — SIGNIFICANT CHANGE UP (ref 1.2–3.4)
MCHC RBC-ENTMCNC: 31 PG — SIGNIFICANT CHANGE UP (ref 27–31)
MCHC RBC-ENTMCNC: 34.2 G/DL — SIGNIFICANT CHANGE UP (ref 32–37)
MCV RBC AUTO: 90.5 FL — SIGNIFICANT CHANGE UP (ref 81–99)
MONOCYTES # BLD AUTO: 1.33 K/UL — HIGH (ref 0.1–0.6)
MONOCYTES NFR BLD AUTO: 8.6 % — SIGNIFICANT CHANGE UP (ref 1.7–9.3)
NEUTROPHILS # BLD AUTO: 11.39 K/UL — HIGH (ref 1.4–6.5)
NEUTROPHILS NFR BLD AUTO: 73.4 % — SIGNIFICANT CHANGE UP (ref 42.2–75.2)
NITRITE UR-MCNC: NEGATIVE — SIGNIFICANT CHANGE UP
NRBC # BLD: 0 /100 WBCS — SIGNIFICANT CHANGE UP (ref 0–0)
PH UR: 6.5 — SIGNIFICANT CHANGE UP (ref 5–8)
PLATELET # BLD AUTO: 335 K/UL — SIGNIFICANT CHANGE UP (ref 130–400)
PMV BLD: 10 FL — SIGNIFICANT CHANGE UP (ref 7.4–10.4)
POTASSIUM SERPL-MCNC: SIGNIFICANT CHANGE UP MMOL/L (ref 3.5–5)
POTASSIUM SERPL-SCNC: SIGNIFICANT CHANGE UP MMOL/L (ref 3.5–5)
PROT SERPL-MCNC: 8 G/DL — SIGNIFICANT CHANGE UP (ref 6–8)
PROT UR-MCNC: NEGATIVE — SIGNIFICANT CHANGE UP
RBC # BLD: 4.2 M/UL — SIGNIFICANT CHANGE UP (ref 4.2–5.4)
RBC # FLD: 14.1 % — SIGNIFICANT CHANGE UP (ref 11.5–14.5)
RBC CASTS # UR COMP ASSIST: 37 /HPF — HIGH (ref 0–4)
SODIUM SERPL-SCNC: 130 MMOL/L — LOW (ref 135–146)
SP GR SPEC: 1.01 — SIGNIFICANT CHANGE UP (ref 1.01–1.03)
UROBILINOGEN FLD QL: SIGNIFICANT CHANGE UP
WBC # BLD: 15.5 K/UL — HIGH (ref 4.8–10.8)
WBC # FLD AUTO: 15.5 K/UL — HIGH (ref 4.8–10.8)
WBC UR QL: 11 /HPF — HIGH (ref 0–5)

## 2023-07-14 PROCEDURE — 87040 BLOOD CULTURE FOR BACTERIA: CPT

## 2023-07-14 PROCEDURE — 84100 ASSAY OF PHOSPHORUS: CPT

## 2023-07-14 PROCEDURE — 83735 ASSAY OF MAGNESIUM: CPT

## 2023-07-14 PROCEDURE — 85025 COMPLETE CBC W/AUTO DIFF WBC: CPT

## 2023-07-14 PROCEDURE — 74177 CT ABD & PELVIS W/CONTRAST: CPT | Mod: 26,MA

## 2023-07-14 PROCEDURE — 87493 C DIFF AMPLIFIED PROBE: CPT

## 2023-07-14 PROCEDURE — 80061 LIPID PANEL: CPT

## 2023-07-14 PROCEDURE — 87507 IADNA-DNA/RNA PROBE TQ 12-25: CPT

## 2023-07-14 PROCEDURE — 36415 COLL VENOUS BLD VENIPUNCTURE: CPT

## 2023-07-14 PROCEDURE — 99285 EMERGENCY DEPT VISIT HI MDM: CPT

## 2023-07-14 PROCEDURE — 83993 ASSAY FOR CALPROTECTIN FECAL: CPT

## 2023-07-14 PROCEDURE — 76830 TRANSVAGINAL US NON-OB: CPT | Mod: 26

## 2023-07-14 PROCEDURE — 85027 COMPLETE CBC AUTOMATED: CPT

## 2023-07-14 PROCEDURE — 80053 COMPREHEN METABOLIC PANEL: CPT

## 2023-07-14 PROCEDURE — 83036 HEMOGLOBIN GLYCOSYLATED A1C: CPT

## 2023-07-14 PROCEDURE — 99222 1ST HOSP IP/OBS MODERATE 55: CPT | Mod: GC

## 2023-07-14 RX ORDER — METRONIDAZOLE 500 MG
1 TABLET ORAL
Qty: 30 | Refills: 0
Start: 2023-07-14 | End: 2023-07-23

## 2023-07-14 RX ORDER — ONDANSETRON 8 MG/1
4 TABLET, FILM COATED ORAL ONCE
Refills: 0 | Status: COMPLETED | OUTPATIENT
Start: 2023-07-14 | End: 2023-07-14

## 2023-07-14 RX ORDER — SODIUM CHLORIDE 9 MG/ML
1000 INJECTION INTRAMUSCULAR; INTRAVENOUS; SUBCUTANEOUS ONCE
Refills: 0 | Status: COMPLETED | OUTPATIENT
Start: 2023-07-14 | End: 2023-07-14

## 2023-07-14 RX ORDER — KETOROLAC TROMETHAMINE 30 MG/ML
15 SYRINGE (ML) INJECTION
Refills: 0 | Status: DISCONTINUED | OUTPATIENT
Start: 2023-07-14 | End: 2023-07-18

## 2023-07-14 RX ORDER — PANTOPRAZOLE SODIUM 20 MG/1
40 TABLET, DELAYED RELEASE ORAL
Refills: 0 | Status: DISCONTINUED | OUTPATIENT
Start: 2023-07-14 | End: 2023-07-18

## 2023-07-14 RX ORDER — LANOLIN ALCOHOL/MO/W.PET/CERES
3 CREAM (GRAM) TOPICAL AT BEDTIME
Refills: 0 | Status: DISCONTINUED | OUTPATIENT
Start: 2023-07-14 | End: 2023-07-18

## 2023-07-14 RX ORDER — MORPHINE SULFATE 50 MG/1
4 CAPSULE, EXTENDED RELEASE ORAL ONCE
Refills: 0 | Status: DISCONTINUED | OUTPATIENT
Start: 2023-07-14 | End: 2023-07-14

## 2023-07-14 RX ORDER — CEFTRIAXONE 500 MG/1
1000 INJECTION, POWDER, FOR SOLUTION INTRAMUSCULAR; INTRAVENOUS EVERY 24 HOURS
Refills: 0 | Status: DISCONTINUED | OUTPATIENT
Start: 2023-07-14 | End: 2023-07-18

## 2023-07-14 RX ORDER — ONDANSETRON 8 MG/1
4 TABLET, FILM COATED ORAL EVERY 8 HOURS
Refills: 0 | Status: DISCONTINUED | OUTPATIENT
Start: 2023-07-14 | End: 2023-07-18

## 2023-07-14 RX ORDER — METRONIDAZOLE 500 MG
500 TABLET ORAL ONCE
Refills: 0 | Status: COMPLETED | OUTPATIENT
Start: 2023-07-14 | End: 2023-07-14

## 2023-07-14 RX ORDER — ACETAMINOPHEN 500 MG
650 TABLET ORAL EVERY 6 HOURS
Refills: 0 | Status: DISCONTINUED | OUTPATIENT
Start: 2023-07-14 | End: 2023-07-18

## 2023-07-14 RX ORDER — HEPARIN SODIUM 5000 [USP'U]/ML
5000 INJECTION INTRAVENOUS; SUBCUTANEOUS EVERY 12 HOURS
Refills: 0 | Status: DISCONTINUED | OUTPATIENT
Start: 2023-07-14 | End: 2023-07-18

## 2023-07-14 RX ORDER — METRONIDAZOLE 500 MG
500 TABLET ORAL EVERY 8 HOURS
Refills: 0 | Status: DISCONTINUED | OUTPATIENT
Start: 2023-07-14 | End: 2023-07-18

## 2023-07-14 RX ORDER — MORPHINE SULFATE 50 MG/1
2 CAPSULE, EXTENDED RELEASE ORAL EVERY 12 HOURS
Refills: 0 | Status: DISCONTINUED | OUTPATIENT
Start: 2023-07-14 | End: 2023-07-15

## 2023-07-14 RX ORDER — CIPROFLOXACIN LACTATE 400MG/40ML
400 VIAL (ML) INTRAVENOUS ONCE
Refills: 0 | Status: COMPLETED | OUTPATIENT
Start: 2023-07-14 | End: 2023-07-14

## 2023-07-14 RX ORDER — CIPROFLOXACIN LACTATE 400MG/40ML
1 VIAL (ML) INTRAVENOUS
Qty: 20 | Refills: 0
Start: 2023-07-14 | End: 2023-07-23

## 2023-07-14 RX ORDER — CEFTRIAXONE 500 MG/1
1000 INJECTION, POWDER, FOR SOLUTION INTRAMUSCULAR; INTRAVENOUS ONCE
Refills: 0 | Status: COMPLETED | OUTPATIENT
Start: 2023-07-14 | End: 2023-07-14

## 2023-07-14 RX ADMIN — CEFTRIAXONE 100 MILLIGRAM(S): 500 INJECTION, POWDER, FOR SOLUTION INTRAMUSCULAR; INTRAVENOUS at 16:30

## 2023-07-14 RX ADMIN — MORPHINE SULFATE 4 MILLIGRAM(S): 50 CAPSULE, EXTENDED RELEASE ORAL at 17:26

## 2023-07-14 RX ADMIN — Medication 200 MILLIGRAM(S): at 17:27

## 2023-07-14 RX ADMIN — Medication 100 MILLIGRAM(S): at 18:32

## 2023-07-14 RX ADMIN — SODIUM CHLORIDE 1000 MILLILITER(S): 9 INJECTION INTRAMUSCULAR; INTRAVENOUS; SUBCUTANEOUS at 13:46

## 2023-07-14 RX ADMIN — MORPHINE SULFATE 4 MILLIGRAM(S): 50 CAPSULE, EXTENDED RELEASE ORAL at 13:47

## 2023-07-14 RX ADMIN — ONDANSETRON 4 MILLIGRAM(S): 8 TABLET, FILM COATED ORAL at 13:42

## 2023-07-14 NOTE — ED ADULT NURSE NOTE - NSICDXFAMILYHX_GEN_ALL_CORE_FT
FAMILY HISTORY:  Mother  Still living? Unknown  HTN (hypertension), Age at diagnosis: Age Unknown

## 2023-07-14 NOTE — ED ADULT NURSE NOTE - NSFALLRISKINTERV_ED_ALL_ED

## 2023-07-14 NOTE — ED PROVIDER NOTE - PHYSICAL EXAMINATION
CONST: Well appearing in NAD  EYES: PERRL, EOMI, Sclera and conjunctiva clear.   ENT: Oropharynx normal appearing, no erythema or exudates. Uvula midline.  CARD: Normal S1 S2; Tachycardia   RESP: Equal BS B/L, No wheezes, rhonchi or rales. No distress  GI: LLQ tenderness.   : L CVA tendernss. Pevic exam, scant white discharge in the vaginal vault. No CMT   MS: Normal ROM in all extremities.   SKIN: Warm, dry, no acute rashes.   NEURO: A&Ox3, No focal deficits.

## 2023-07-14 NOTE — H&P ADULT - ATTENDING COMMENTS
HPI:  A 43y F pmh fibroids, ovarian cyst, UTI, nephrolithiasis, b/r tubal ligation 13 years ago presented to the ED complaining of LLQ abdominal pain x2 days, acute onset , progressively worse, aggravated with movement and a/w nausea. The patient also complained of dysuria and vaginal discharge x1 week.  Patient admits to being sexually active with one partner.  Denies fevers, chills, diarrhea, hematuria, melena, vomiting, hematemesis. chest pain, SOB, headache, dizziness or LOC.      #ED vitals:  T(C): 36.8 (23 @ 12:20), Max: 36.8 (23 @ 12:20)  HR: 79 (23 @ 12:20) (79 - 79)  BP: 144/73 (23 @ 12:20) (144/73 - 144/73)  RR: 18 (23 @ 12:20) (18 - 18)  SpO2: 98% (23 @ 12:20) (98% - 98%)    #Labs significant for:  WBC 15.5, Na 130, AST 85, ALT 75, UA: LE pos, WBC 11    #Imagin)CT Abdomen and Pelvis w/ IV Cont:  Colonic diverticulosis with thickened sigmoid and distal descending colonic wall with surrounding fat stranding, compatible with diverticulitis/colitis. No drainable collection.    2)US Transvaginal  No acute sonographic abnormality. Uterine fibroid, as described.    #S/P: Ceftriaxone 1gm IV x 1, Cipro 400 IV x 1, Flagyl 500 IV x 1, NS bolus 1000cc, Morphine 4mg IV x 2 in the ED.    The patient is being admitted to medicine for the  management of acute diverticulitis and uncomplicated UTI.   (2023 20:29)    REVIEW OF SYSTEMS: see cc/HPI   CONSTITUTIONAL: No weakness, fevers or chills  EYES/ENT: No visual changes;  No vertigo or throat pain   NECK: No pain or stiffness  RESPIRATORY: No cough, wheezing, hemoptysis; No shortness of breath  CARDIOVASCULAR: No chest pain or palpitations  GASTROINTESTINAL: (+) abdominal pain. No nausea, vomiting, or hematemesis; No diarrhea or constipation. No melena or hematochezia.  GENITOURINARY: (+) dysuria, NO frequency or hematuria  NEUROLOGICAL: No numbness or weakness  SKIN: No itching, rashes  ENDO:  PSYCHE:  MSK:    Physical Exam:   General: WN/WD NAD  Neurology: A&Ox3, nonfocal, follows commands  Eyes: PERRLA/ EOMI  ENT/Neck: Neck supple, trachea midline, No JVD  Respiratory: CTA B/L, No wheezing, rales, rhonchi  CV: Normal rate regular rhythm, S1S2, no murmurs, rubs or gallops  Abdominal: Soft, NT, ND +BS, ##################  Extremities: No edema, + peripheral pulses  Skin: No Rashes, Hematoma, Ecchymosis  Incisions:   Tubes: HPI:  A 43y F pmh fibroids, ovarian cyst, UTI, nephrolithiasis, b/r tubal ligation 13 years ago presented to the ED complaining of LLQ abdominal pain x2 days, acute onset , progressively worse, aggravated with movement and a/w nausea. The patient also complained of dysuria and vaginal discharge x1 week.  Patient admits to being sexually active with one partner.  Denies fevers, chills, diarrhea, hematuria, melena, vomiting, hematemesis. chest pain, SOB, headache, dizziness or LOC.      #ED vitals:  T(C): 36.8 (23 @ 12:20), Max: 36.8 (23 @ 12:20)  HR: 79 (23 @ 12:20) (79 - 79)  BP: 144/73 (23 @ 12:20) (144/73 - 144/73)  RR: 18 (23 @ 12:20) (18 - 18)  SpO2: 98% (23 @ 12:20) (98% - 98%)    #Labs significant for:  WBC 15.5, Na 130, AST 85, ALT 75, UA: LE pos, WBC 11    #Imagin)CT Abdomen and Pelvis w/ IV Cont:  Colonic diverticulosis with thickened sigmoid and distal descending colonic wall with surrounding fat stranding, compatible with diverticulitis/colitis. No drainable collection.    2)US Transvaginal  No acute sonographic abnormality. Uterine fibroid, as described.    #S/P: Ceftriaxone 1gm IV x 1, Cipro 400 IV x 1, Flagyl 500 IV x 1, NS bolus 1000cc, Morphine 4mg IV x 2 in the ED.    The patient is being admitted to medicine for the  management of acute diverticulitis and uncomplicated UTI.   (2023 20:29)    REVIEW OF SYSTEMS: see cc/HPI   CONSTITUTIONAL: No weakness, fevers or chills  EYES/ENT: No visual changes;  No vertigo or throat pain   NECK: No pain or stiffness  RESPIRATORY: No cough, wheezing, hemoptysis; No shortness of breath  CARDIOVASCULAR: No chest pain or palpitations  GASTROINTESTINAL: (+) abdominal pain. (+)  nausea, vomiting, or hematemesis; (+) diarrhea, No constipation. No melena or hematochezia.  GENITOURINARY: (+) dysuria, NO frequency or hematuria  NEUROLOGICAL: No numbness or weakness  SKIN: No itching, rashes  ENDO: no dyslipidemia, no hyperglycemia   PSYCHE: No depression / mood d/o , no psychosis  MSK: no fracture, no deformity    Physical Exam:   General: WN/WD NAD  Neurology: A&Ox3, nonfocal, follows commands  Eyes: PERRLA/ EOMI  ENT/Neck: Neck supple, trachea midline, No JVD  Respiratory: CTA B/L, No wheezing, rales, rhonchi  CV: Normal rate regular rhythm, S1S2, no murmurs, rubs or gallops  Abdominal: Soft, ND +BS, LLQ tenderness and guarding, no rebound, no rigidity   Extremities: No edema, + peripheral pulses  Skin: No Rashes, Hematoma, Ecchymosis    A/p  Acute diverticulitis vs. colitis   -IV abx   -check blood Cx and repeat WBC  -IV fluids and IV PPI   -clear liquid diet for now and advance if improving   -GI eval   -PRN pain Rx - Morphine for severe pain   -PRN fever control    UTI   -IV abx   -check Ucx     Hyponatremia   - IV fluids   -repeat BMP in AM     Transaminitis / hepatic steatosis on CT   -serial LFTs     DVT prophylaxis HPI:  A 43y F pmh fibroids, ovarian cyst, UTI, nephrolithiasis, b/r tubal ligation 13 years ago presented to the ED complaining of LLQ abdominal pain x2 days, acute onset , progressively worse, aggravated with movement and a/w nausea. The patient also complained of dysuria and vaginal discharge x1 week.  Patient admits to being sexually active with one partner.  Denies fevers, chills, diarrhea, hematuria, melena, vomiting, hematemesis. chest pain, SOB, headache, dizziness or LOC.      #ED vitals:  T(C): 36.8 (23 @ 12:20), Max: 36.8 (23 @ 12:20)  HR: 79 (23 @ 12:20) (79 - 79)  BP: 144/73 (23 @ 12:20) (144/73 - 144/73)  RR: 18 (23 @ 12:20) (18 - 18)  SpO2: 98% (23 @ 12:20) (98% - 98%)    #Labs significant for:  WBC 15.5, Na 130, AST 85, ALT 75, UA: LE pos, WBC 11    #Imagin)CT Abdomen and Pelvis w/ IV Cont:  Colonic diverticulosis with thickened sigmoid and distal descending colonic wall with surrounding fat stranding, compatible with diverticulitis/colitis. No drainable collection.    2)US Transvaginal  No acute sonographic abnormality. Uterine fibroid, as described.    #S/P: Ceftriaxone 1gm IV x 1, Cipro 400 IV x 1, Flagyl 500 IV x 1, NS bolus 1000cc, Morphine 4mg IV x 2 in the ED.    The patient is being admitted to medicine for the  management of acute diverticulitis and uncomplicated UTI.   (2023 20:29)    REVIEW OF SYSTEMS: see cc/HPI   CONSTITUTIONAL: No weakness, fevers or chills  EYES/ENT: No visual changes;  No vertigo or throat pain   NECK: No pain or stiffness  RESPIRATORY: No cough, wheezing, hemoptysis; No shortness of breath  CARDIOVASCULAR: No chest pain or palpitations  GASTROINTESTINAL: (+) abdominal pain. (+)  nausea, vomiting, or hematemesis; (+) diarrhea, No constipation. No melena or hematochezia.  GENITOURINARY: (+) dysuria, NO frequency or hematuria  NEUROLOGICAL: No numbness or weakness  SKIN: No itching, rashes  ENDO: no dyslipidemia, no hyperglycemia   PSYCHE: No depression / mood d/o , no psychosis  MSK: no fracture, no deformity    Physical Exam:   General: WN/WD NAD  Neurology: A&Ox3, nonfocal, follows commands  Eyes: PERRLA/ EOMI  ENT/Neck: Neck supple, trachea midline, No JVD  Respiratory: CTA B/L, No wheezing, rales, rhonchi  CV: Normal rate regular rhythm, S1S2, no murmurs, rubs or gallops  Abdominal: Soft, ND +BS, LLQ tenderness and guarding, no rebound, no rigidity   Extremities: No edema, + peripheral pulses  Skin: No Rashes, Hematoma, Ecchymosis    A/p  Acute diverticulitis vs. colitis   -IV abx   -check blood Cx and repeat WBC  -IV fluids and IV PPI   -clear liquid diet for now and advance if improving   -GI eval   -PRN pain Rx - Morphine for severe pain   -PRN fever control    UTI   -IV abx   -check Ucx     Hyponatremia   - IV fluids   -repeat BMP in AM     Transaminitis / hepatic steatosis on CT   -serial LFTs     DVT prophylaxis    PATIENT SEEN by ATTENDING 23 ( note revised 7/15)

## 2023-07-14 NOTE — H&P ADULT - NSHPLABSRESULTS_GEN_ALL_CORE
LABS:                         13.0   15.50 )-----------( 335      ( 2023 14:18 )             38.0     07-14    130<L>  |  101  |  11  ----------------------------<  104<H>  TNP   |  24  |  0.7    Ca    8.9      2023 14:18    TPro  8.0  /  Alb  4.2  /  TBili  0.3  /  DBili  x   /  AST  85<H>  /  ALT  75<H>  /  AlkPhos  47  -14      Urinalysis Basic - ( 2023 14:18 )    Color: Colorless / Appearance: Clear / S.008 / pH: x  Gluc: 104 mg/dL / Ketone: Negative  / Bili: Negative / Urobili: <2 mg/dL   Blood: x / Protein: Negative / Nitrite: Negative   Leuk Esterase: Moderate / RBC: 37 /HPF / WBC 11 /HPF   Sq Epi: x / Non Sq Epi: x / Bacteria: Negative            Lactate, Blood: 0.9 mmol/L ( @ 14:18)

## 2023-07-14 NOTE — ED PROVIDER NOTE - PROGRESS NOTE DETAILS
JS: Patient requiring multiple doses of IV pain medication. Agreeable for admission for intractable and pain 2/2 diverticulitis.

## 2023-07-14 NOTE — H&P ADULT - ASSESSMENT
A 43y F pmh fibroids, ovarian cyst, UTI, nephrolithiasis, b/r tubal ligation 13 years ago presented to the ED complaining of LLQ abdominal pain x2 days, acute onset , progressively worse, aggravated with movement and a/w nausea. The patient also complained of dysuria and vaginal discharge x1 week.  Patient admits to being sexually active with one partner.     #Acute diverticulitis  -CT Abdomen and Pelvis w/ IV Cont: Colonic diverticulosis with thickened sigmoid and distal descending colonic wall with surrounding fat stranding, compatible with diverticulitis/colitis. No drainable collection.   A 43y F pmh fibroids, ovarian cyst, UTI, nephrolithiasis, b/r tubal ligation 13 years ago presented to the ED complaining of LLQ abdominal pain x2 days, acute onset , progressively worse, aggravated with movement and a/w nausea. The patient also complained of dysuria and vaginal discharge x1 week.  Patient admits to being sexually active with one partner.     #Acute diverticulitis  -CT Abdomen and Pelvis w/ IV Cont: Colonic diverticulosis with thickened sigmoid and distal descending colonic wall with surrounding fat stranding, compatible with diverticulitis/colitis. No drainable collection.  -F/U GI PCR  -S/P: Ceftriaxone 1gm IV x 1, Cipro 400 IV x 1, Flagyl 500 IV x 1 in the ED  -Will continue with Ceftriaxone and flagyl   -OP Follow up in 4-6 weeks with GI for colonoscopy  -pain control  -High fiber diet    #Uncomplicated UTI  -multiple episodes in last 2 yrs  -will continue with rocephin  -f/u urine cx    #MISC  -GI ppx: PPI  -DCT ppx: heparin sq  -Diet: DASH/High fiber  -Activity: IAT  -Code status: Full code  -Dispo: acute, Follow up Urine cx   A 43y F pmh fibroids, ovarian cyst, UTI, nephrolithiasis, b/r tubal ligation 13 years ago presented to the ED complaining of LLQ abdominal pain x2 days, acute onset , progressively worse, aggravated with movement and a/w nausea. The patient also complained of dysuria and vaginal discharge x1 week.  Patient admits to being sexually active with one partner.     #Acute diverticulitis  -CT Abdomen and Pelvis w/ IV Cont: Colonic diverticulosis with thickened sigmoid and distal descending colonic wall with surrounding fat stranding, compatible with diverticulitis/colitis. No drainable collection.  -F/U GI PCR  -S/P: Ceftriaxone 1gm IV x 1, Cipro 400 IV x 1, Flagyl 500 IV x 1 in the ED  -Will continue with Ceftriaxone and flagyl   -OP Follow up in 4-6 weeks with GI for colonoscopy  -pain control  -High fiber diet    #Uncomplicated UTI  -multiple episodes in last 2 yrs  -will continue with rocephin  -f/u urine cx    #Hyponatremia  -could be due to decreased oral intake  -s/p NS bolus 2lin the ED  -trend BMP    #Transaminitis  -AST 85, ALT 75  -NO c/o RUQ pain  -trend LFT    #MISC  -GI ppx: PPI  -DCT ppx: heparin sq  -Diet: DASH/High fiber  -Activity: IAT  -Code status: Full code  -Dispo: acute, Follow up Urine cx   A 43y F pmh fibroids, ovarian cyst, UTI, nephrolithiasis, b/r tubal ligation 13 years ago presented to the ED complaining of LLQ abdominal pain x3 days, acute onset , progressively worse, aggravated with movement and a/w nausea and 3 episodes of loose stool. The patient also complained of dysuria and vaginal discharge x1 week. The patient had similar complains for 1 day about a week back.    #Acute diverticulitis/Colitis  -CT Abdomen and Pelvis w/ IV Cont: Colonic diverticulosis with thickened sigmoid and distal descending colonic wall with surrounding fat stranding, compatible with diverticulitis/colitis. No drainable collection.  -F/U GI PCR  -S/P: Ceftriaxone 1gm IV x 1, Cipro 400 IV x 1, Flagyl 500 IV x 1 in the ED  -Will continue with Ceftriaxone and flagyl   -OP Follow up in 4-6 weeks with GI for colonoscopy  -pain control  -High fiber diet    #Uncomplicated UTI  -multiple episodes in last 2 yrs  -will continue with rocephin  -f/u urine cx    #Hyponatremia  -could be due to decreased oral intake  -s/p NS bolus 2lin the ED  -trend BMP    #Transaminitis  -AST 85, ALT 75  -NO c/o RUQ pain  -trend LFT    #MISC  -GI ppx: PPI  -DCT ppx: heparin sq  -Diet: DASH/High fiber  -Activity: IAT  -Code status: Full code  -Dispo: acute, Follow up Urine cx

## 2023-07-14 NOTE — ED PROVIDER NOTE - NS ED ATTENDING STATEMENT MOD
This was a shared visit with the BELLO. I reviewed and verified the documentation and independently performed the documented:

## 2023-07-14 NOTE — ED ADULT TRIAGE NOTE - CHIEF COMPLAINT QUOTE
Patient presents to ED c/o abdominal pain and increased pain when attempting to have BM. Patient reports bowel movement yesterday

## 2023-07-14 NOTE — ED PROVIDER NOTE - CARE PROVIDER_API CALL
Emily Galvez  Gastroenterology  [address] [facility] [place] [address]  Phone: [phone]  Fax: [phone]  Follow Up Time: 4-6 Days

## 2023-07-14 NOTE — ED PROVIDER NOTE - OBJECTIVE STATEMENT
43-year-old female presents the emergency department complaining of abdominal pain x2 days.  Patient with acute onset of left lower quadrant abdominal pain, worsening since.  Pain is worse with movement.  Associated nausea without vomiting, dysuria and vaginal discharge x1 week.  Patient admits to being sexually active with one partner not using protection.  Denies fevers, chills, diarrhea, hematuria, melena, vomiting, hematemesis

## 2023-07-14 NOTE — ED PROVIDER NOTE - ATTENDING APP SHARED VISIT CONTRIBUTION OF CARE
42 yo f with no pmh presents with c/o abd pain x 2 days.  pt noted mild vaginal dc 1 week ago.  no dysuria, vomiting, diarrhea. flank pain, f/c.  no hematuria.  ?h/o std in the past.  no previous cscopes.  exam: nad, ncat, perrl, eomi, mmm, rrr, ctab, abd soft, ttp LLQ, no rebound, mild guarding, nd aox3, pelvic as per lliy caldera imp: pt with LLQ pain, will check labs, ua, ct a/p

## 2023-07-14 NOTE — ED PROVIDER NOTE - PATIENT PORTAL LINK FT
You can access the FollowMyHealth Patient Portal offered by [facility] by registering at the following website: [email or web address]. By joining [facility]’s FollowMyHealth portal, you will also be able to view your health information using other applications (apps) compatible with our system.

## 2023-07-14 NOTE — ED PROVIDER NOTE - CLINICAL SUMMARY MEDICAL DECISION MAKING FREE TEXT BOX
pt with LLQ, neg us, found to have diverticulitis on ct.  will start on abx.  pt is nontoxic, will dc to f/u with pmd/gi outpt. Any ordered labs and EKG were reviewed.  Any imaging was ordered and reviewed by me.  Appropriate medications for patient's presenting complaints were ordered and effects were reassessed.  Patient's records (prior hospital, ED visit, and/or nursing home notes if available) were reviewed.  Additional history was obtained from EMS, family, and/or PCP (where available).  Escalation to admission/observation was considered.  1) However patient feels much better and is comfortable with discharge.  Appropriate follow-up was arranged.

## 2023-07-14 NOTE — H&P ADULT - NSHPPHYSICALEXAM_GEN_ALL_CORE
CONSTITUTIONAL: Well groomed, no apparent distress  EYES: PERRLA and symmetric, EOMI, No conjunctival or scleral injection, non-icteric  ENMT: Oral mucosa with moist membranes. Normal dentition; no pharyngeal injection or exudates             NECK: Supple, symmetric and without tracheal deviation   RESP: No respiratory distress, no use of accessory muscles; CTA b/l, no WRR  CV: RRR, +S1S2, no MRG; no JVD; no peripheral edema  GI: Soft, NT, ND, no rebound, no guarding; no palpable masses; no hepatosplenomegaly; no hernia palpated  LYMPH: No cervical LAD or tenderness; no axillary LAD or tenderness; no inguinal LAD or tenderness  MSK: Normal gait; No digital clubbing or cyanosis; examination of the (head/neck/spine/ribs/pelvis, RUE, LUE, RLE, LLE) without misalignment,            Normal ROM without pain, no spinal tenderness, normal muscle strength/tone  SKIN: No rashes or ulcers noted; no subcutaneous nodules or induration palpable  NEURO: CN II-XII intact; normal reflexes in upper and lower extremities, sensation intact in upper and lower extremities b/l to light touch   PSYCH: Appropriate insight/judgment; A+O x 3, mood and affect appropriate, recent/remote memory intact CONSTITUTIONAL: Well groomed, no apparent distress  EYES: PERRLA and symmetric, EOMI, No conjunctival or scleral injection, non-icteric  ENMT: Oral mucosa with moist membranes. Normal dentition; no pharyngeal injection or exudates  NECK: Supple, symmetric and without tracheal deviation   RESP: No respiratory distress, no use of accessory muscles; CTA b/l, no WRR  CV: RRR, +S1S2, no MRG; no JVD; no peripheral edema  GI: Soft, Mild tenderness present over LLQ  LYMPH: No cervical LAD or tenderness; no axillary LAD or tenderness; no inguinal LAD or tenderness  MSK: No digital clubbing or cyanosis  SKIN: No rashes or ulcers noted; no subcutaneous nodules or induration palpable  NEURO: CN II-XII intact; normal reflexes in upper and lower extremities, sensation intact in upper and lower extremities b/l to light touch   PSYCH: Appropriate insight/judgment; A+O x 3, mood and affect appropriate, recent/remote memory intact

## 2023-07-14 NOTE — H&P ADULT - HISTORY OF PRESENT ILLNESS
A 43y F pmh fibroids, ovarian cyst, UTI, nephrolithiasis, b/r tubal ligation 13 years ago presented to the ED complaining of LLQ abdominal pain x2 days, acute onset , progressively worse, aggravated with movement and a/w nausea.  Patient with acute onset of left lower quadrant abdominal pain, worsening since.  Pain is worse with movement.  Associated nausea without vomiting, dysuria and vaginal discharge x1 week.  Patient admits to being sexually active with one partner not using protection.  Denies fevers, chills, diarrhea, hematuria, melena, vomiting, hematemesis A 43y F pmh fibroids, ovarian cyst, UTI, nephrolithiasis, b/r tubal ligation 13 years ago presented to the ED complaining of LLQ abdominal pain x2 days, acute onset , progressively worse, aggravated with movement and a/w nausea. The patient also complained of dysuria and vaginal discharge x1 week.  Patient admits to being sexually active with one partner.  Denies fevers, chills, diarrhea, hematuria, melena, vomiting, hematemesis. chest pain, SOB, headache, dizziness or LOC.      #ED vitals:  T(C): 36.8 (23 @ 12:20), Max: 36.8 (23 @ 12:20)  HR: 79 (23 @ 12:20) (79 - 79)  BP: 144/73 (23 @ 12:20) (144/73 - 144/73)  RR: 18 (23 @ 12:20) (18 - 18)  SpO2: 98% (23 @ 12:20) (98% - 98%)    #Labs significant for:  WBC 15.5, Na 130, AST 85, ALT 75, UA: LE pos, WBC 11    #Imagin)CT Abdomen and Pelvis w/ IV Cont:  Colonic diverticulosis with thickened sigmoid and distal descending colonic wall with surrounding fat stranding, compatible with diverticulitis/colitis. No drainable collection.    2)US Transvaginal  No acute sonographic abnormality. Uterine fibroid, as described.    #S/P: Ceftriaxone 1gm IV x 1, Cipro 400 IV x 1, Flagyl 500 IV x 1, NS bolus 1000cc, Morphine 4mg IV x 2 in the ED.    The patient is being admitted to medicine for the  management of acute diverticulitis and uncomplicated UTI.         A 43y F pmh fibroids, ovarian cyst, UTI, nephrolithiasis, b/r tubal ligation 13 years ago presented to the ED complaining of LLQ abdominal pain x3 days, acute onset , progressively worse, aggravated with movement and a/w nausea and 3 episodes of loose stool. The patient also complained of dysuria and vaginal discharge x1 week. The patient had similar complains for 1 day about a week back. Patient admits to being sexually active with one partner.  Denies fevers, chills, diarrhea, hematuria, melena, vomiting, hematemesis. chest pain, SOB, headache, dizziness or LOC.  LMP: does not remember the exact date but her date is due today(cycles are regular with flow of about 7 days).    #ED vitals:  T(C): 36.8 (23 @ 12:20), Max: 36.8 (23 @ 12:20)  HR: 79 (23 @ 12:20) (79 - 79)  BP: 144/73 (23 @ 12:20) (144/73 - 144/73)  RR: 18 (23 @ 12:20) (18 - 18)  SpO2: 98% (23 @ 12:20) (98% - 98%)    #Labs significant for:  WBC 15.5, Na 130, AST 85, ALT 75, UA: LE pos, WBC 11    #Imagin)CT Abdomen and Pelvis w/ IV Cont:  Colonic diverticulosis with thickened sigmoid and distal descending colonic wall with surrounding fat stranding, compatible with diverticulitis/colitis. No drainable collection.    2)US Transvaginal  No acute sonographic abnormality. Uterine fibroid, as described.    #S/P: Ceftriaxone 1gm IV x 1, Cipro 400 IV x 1, Flagyl 500 IV x 1, NS bolus 1000cc, Morphine 4mg IV x 2 in the ED.    The patient is being admitted to medicine for the  management of acute diverticulitis and uncomplicated UTI.

## 2023-07-15 LAB
A1C WITH ESTIMATED AVERAGE GLUCOSE RESULT: 6.1 % — HIGH (ref 4–5.6)
ALBUMIN SERPL ELPH-MCNC: 3.6 G/DL — SIGNIFICANT CHANGE UP (ref 3.5–5.2)
ALP SERPL-CCNC: 63 U/L — SIGNIFICANT CHANGE UP (ref 30–115)
ALT FLD-CCNC: 49 U/L — HIGH (ref 0–41)
ANION GAP SERPL CALC-SCNC: 11 MMOL/L — SIGNIFICANT CHANGE UP (ref 7–14)
AST SERPL-CCNC: 18 U/L — SIGNIFICANT CHANGE UP (ref 0–41)
BASOPHILS # BLD AUTO: 0.06 K/UL — SIGNIFICANT CHANGE UP (ref 0–0.2)
BASOPHILS NFR BLD AUTO: 0.5 % — SIGNIFICANT CHANGE UP (ref 0–1)
BILIRUB SERPL-MCNC: 0.3 MG/DL — SIGNIFICANT CHANGE UP (ref 0.2–1.2)
BUN SERPL-MCNC: 7 MG/DL — LOW (ref 10–20)
C DIFF BY PCR RESULT: SIGNIFICANT CHANGE UP
C TRACH RRNA SPEC QL NAA+PROBE: SIGNIFICANT CHANGE UP
CALCIUM SERPL-MCNC: 8.3 MG/DL — LOW (ref 8.4–10.5)
CHLORIDE SERPL-SCNC: 104 MMOL/L — SIGNIFICANT CHANGE UP (ref 98–110)
CHOLEST SERPL-MCNC: 123 MG/DL — SIGNIFICANT CHANGE UP
CO2 SERPL-SCNC: 24 MMOL/L — SIGNIFICANT CHANGE UP (ref 17–32)
CREAT SERPL-MCNC: 0.5 MG/DL — LOW (ref 0.7–1.5)
CULTURE RESULTS: SIGNIFICANT CHANGE UP
EGFR: 119 ML/MIN/1.73M2 — SIGNIFICANT CHANGE UP
EOSINOPHIL # BLD AUTO: 0.23 K/UL — SIGNIFICANT CHANGE UP (ref 0–0.7)
EOSINOPHIL NFR BLD AUTO: 2.1 % — SIGNIFICANT CHANGE UP (ref 0–8)
ESTIMATED AVERAGE GLUCOSE: 128 MG/DL — HIGH (ref 68–114)
GLUCOSE SERPL-MCNC: 103 MG/DL — HIGH (ref 70–99)
HCT VFR BLD CALC: 35.2 % — LOW (ref 37–47)
HDLC SERPL-MCNC: 34 MG/DL — LOW
HGB BLD-MCNC: 11.9 G/DL — LOW (ref 12–16)
IMM GRANULOCYTES NFR BLD AUTO: 0.2 % — SIGNIFICANT CHANGE UP (ref 0.1–0.3)
LIPID PNL WITH DIRECT LDL SERPL: 73 MG/DL — SIGNIFICANT CHANGE UP
LYMPHOCYTES # BLD AUTO: 2.62 K/UL — SIGNIFICANT CHANGE UP (ref 1.2–3.4)
LYMPHOCYTES # BLD AUTO: 23.8 % — SIGNIFICANT CHANGE UP (ref 20.5–51.1)
MAGNESIUM SERPL-MCNC: 2.1 MG/DL — SIGNIFICANT CHANGE UP (ref 1.8–2.4)
MCHC RBC-ENTMCNC: 30.7 PG — SIGNIFICANT CHANGE UP (ref 27–31)
MCHC RBC-ENTMCNC: 33.8 G/DL — SIGNIFICANT CHANGE UP (ref 32–37)
MCV RBC AUTO: 90.7 FL — SIGNIFICANT CHANGE UP (ref 81–99)
MONOCYTES # BLD AUTO: 1.22 K/UL — HIGH (ref 0.1–0.6)
MONOCYTES NFR BLD AUTO: 11.1 % — HIGH (ref 1.7–9.3)
N GONORRHOEA RRNA SPEC QL NAA+PROBE: SIGNIFICANT CHANGE UP
NEUTROPHILS # BLD AUTO: 6.88 K/UL — HIGH (ref 1.4–6.5)
NEUTROPHILS NFR BLD AUTO: 62.3 % — SIGNIFICANT CHANGE UP (ref 42.2–75.2)
NON HDL CHOLESTEROL: 89 MG/DL — SIGNIFICANT CHANGE UP
NRBC # BLD: 0 /100 WBCS — SIGNIFICANT CHANGE UP (ref 0–0)
PLATELET # BLD AUTO: 319 K/UL — SIGNIFICANT CHANGE UP (ref 130–400)
PMV BLD: 10.2 FL — SIGNIFICANT CHANGE UP (ref 7.4–10.4)
POTASSIUM SERPL-MCNC: 3.7 MMOL/L — SIGNIFICANT CHANGE UP (ref 3.5–5)
POTASSIUM SERPL-SCNC: 3.7 MMOL/L — SIGNIFICANT CHANGE UP (ref 3.5–5)
PROT SERPL-MCNC: 6.1 G/DL — SIGNIFICANT CHANGE UP (ref 6–8)
RBC # BLD: 3.88 M/UL — LOW (ref 4.2–5.4)
RBC # FLD: 14 % — SIGNIFICANT CHANGE UP (ref 11.5–14.5)
SODIUM SERPL-SCNC: 139 MMOL/L — SIGNIFICANT CHANGE UP (ref 135–146)
SPECIMEN SOURCE: SIGNIFICANT CHANGE UP
SPECIMEN SOURCE: SIGNIFICANT CHANGE UP
TRIGL SERPL-MCNC: 77 MG/DL — SIGNIFICANT CHANGE UP
WBC # BLD: 11.03 K/UL — HIGH (ref 4.8–10.8)
WBC # FLD AUTO: 11.03 K/UL — HIGH (ref 4.8–10.8)

## 2023-07-15 PROCEDURE — 99232 SBSQ HOSP IP/OBS MODERATE 35: CPT

## 2023-07-15 PROCEDURE — 99222 1ST HOSP IP/OBS MODERATE 55: CPT

## 2023-07-15 RX ORDER — MORPHINE SULFATE 50 MG/1
2 CAPSULE, EXTENDED RELEASE ORAL EVERY 4 HOURS
Refills: 0 | Status: DISCONTINUED | OUTPATIENT
Start: 2023-07-15 | End: 2023-07-15

## 2023-07-15 RX ORDER — SODIUM CHLORIDE 9 MG/ML
1000 INJECTION, SOLUTION INTRAVENOUS
Refills: 0 | Status: DISCONTINUED | OUTPATIENT
Start: 2023-07-15 | End: 2023-07-16

## 2023-07-15 RX ADMIN — Medication 15 MILLIGRAM(S): at 01:35

## 2023-07-15 RX ADMIN — Medication 100 MILLIGRAM(S): at 14:51

## 2023-07-15 RX ADMIN — Medication 15 MILLIGRAM(S): at 18:31

## 2023-07-15 RX ADMIN — Medication 100 MILLIGRAM(S): at 05:34

## 2023-07-15 RX ADMIN — HEPARIN SODIUM 5000 UNIT(S): 5000 INJECTION INTRAVENOUS; SUBCUTANEOUS at 18:32

## 2023-07-15 RX ADMIN — Medication 15 MILLIGRAM(S): at 02:05

## 2023-07-15 RX ADMIN — PANTOPRAZOLE SODIUM 40 MILLIGRAM(S): 20 TABLET, DELAYED RELEASE ORAL at 05:37

## 2023-07-15 RX ADMIN — SODIUM CHLORIDE 75 MILLILITER(S): 9 INJECTION, SOLUTION INTRAVENOUS at 01:44

## 2023-07-15 RX ADMIN — Medication 15 MILLIGRAM(S): at 19:01

## 2023-07-15 RX ADMIN — Medication 15 MILLIGRAM(S): at 05:36

## 2023-07-15 RX ADMIN — CEFTRIAXONE 100 MILLIGRAM(S): 500 INJECTION, POWDER, FOR SOLUTION INTRAMUSCULAR; INTRAVENOUS at 05:34

## 2023-07-15 RX ADMIN — Medication 100 MILLIGRAM(S): at 21:39

## 2023-07-15 RX ADMIN — HEPARIN SODIUM 5000 UNIT(S): 5000 INJECTION INTRAVENOUS; SUBCUTANEOUS at 05:36

## 2023-07-15 NOTE — CONSULT NOTE ADULT - SUBJECTIVE AND OBJECTIVE BOX
Gastroenterology Consultation:    Patient is a 43y old  Female who presents with a chief complaint of Diverticulitis/UTI (14 Jul 2023 20:29)        Admitted on: 07-14-23      HPI:    A 43y F pmh fibroids, ovarian cyst, UTI, nephrolithiasis, b/r tubal ligation 13 years ago presented to the ED complaining of LLQ abdominal pain x3 days, acute onset , progressively worse, aggravated with movement and a/w nausea and 3 episodes of loose stool. The patient also complained of dysuria and vaginal discharge x1 week. The patient had similar complains for 1 day about a week back. Patient admits to being sexually active with one partner.  Denies fevers, chills, diarrhea, hematuria, melena, vomiting, hematemesis. chest pain, SOB, headache, dizziness or LOC. GI was consulted for management of diverticulitis. no previous hx of similar episodes or hx of colonoscopies or family hx of gastric or colon cancer or ibd            PAST MEDICAL & SURGICAL HISTORY:  UTI (urinary tract infection)      Ovarian cyst      Fibroids  uterine      No significant past surgical history            FAMILY HISTORY:  HTN (hypertension) (Mother)        Social History:  Tobacco: denies   Alcohol: denies   Drugs: denies     Home Medications:  acetaminophen 325 mg oral tablet: 2 tab(s) orally every 6 hours (20 Mar 2019 12:36)  omeprazole 10 mg oral delayed release capsule: 1 cap(s) orally once a day (19 Mar 2019 06:55)  Tylenol 500 mg oral tablet: 1 tab(s) orally 3 times a day as needed for  mild pain (14 Jul 2023 23:20)        MEDICATIONS  (STANDING):  cefTRIAXone   IVPB 1000 milliGRAM(s) IV Intermittent every 24 hours  dextrose 5% + sodium chloride 0.45%. 1000 milliLiter(s) (75 mL/Hr) IV Continuous <Continuous>  heparin   Injectable 5000 Unit(s) SubCutaneous every 12 hours  ketorolac   Injectable 15 milliGRAM(s) IV Push two times a day  metroNIDAZOLE  IVPB 500 milliGRAM(s) IV Intermittent every 8 hours  pantoprazole    Tablet 40 milliGRAM(s) Oral before breakfast    MEDICATIONS  (PRN):  acetaminophen     Tablet .. 650 milliGRAM(s) Oral every 6 hours PRN Temp greater or equal to 38C (100.4F), Mild Pain (1 - 3)  aluminum hydroxide/magnesium hydroxide/simethicone Suspension 30 milliLiter(s) Oral every 4 hours PRN Dyspepsia  melatonin 3 milliGRAM(s) Oral at bedtime PRN Insomnia  morphine  - Injectable 2 milliGRAM(s) IV Push every 4 hours PRN Severe Pain (7 - 10)  ondansetron Injectable 4 milliGRAM(s) IV Push every 8 hours PRN Nausea and/or Vomiting      Allergies  No Known Allergies      Review of Systems:   Constitutional:  No Fever, No Chills  ENT/Mouth:  No Hearing Changes,  No Difficulty Swallowing  Eyes:  No Eye Pain, No Vision Changes  Cardiovascular:  No Chest Pain, No Palpitations  Respiratory:  No Cough, No Dyspnea  Gastrointestinal:  As described in HPI          Physical Examination:  T(C): 36.5 (07-15-23 @ 08:39), Max: 36.5 (07-15-23 @ 08:39)  HR: 65 (07-15-23 @ 08:39) (62 - 77)  BP: 101/57 (07-15-23 @ 08:39) (98/61 - 119/67)  RR: 18 (07-15-23 @ 08:39) (18 - 18)  SpO2: 95% (07-15-23 @ 08:39) (94% - 96%)  Height (cm): 160 (07-15-23 @ 01:24)  Weight (kg): 75.8 (07-15-23 @ 01:24)    07-14-23 @ 07:01  -  07-15-23 @ 07:00  --------------------------------------------------------  IN: 450 mL / OUT: 350 mL / NET: 100 mL          GENERAL: AAOx3, no acute distress.  HEAD:  Atraumatic, Normocephalic  EYES: conjunctiva and sclera clear  CHEST/LUNG: Clear to auscultation bilaterally; No wheeze, rhonchi, or rales  HEART: Regular rate and rhythm; normal S1, S2, No murmurs.  ABDOMEN: Soft, LLQ tenderness to deep palpation, nondistended; Bowel sounds present  SKIN: Intact, no jaundice        Data:                        11.9   11.03 )-----------( 319      ( 15 Jul 2023 06:01 )             35.2     Hgb Trend:  11.9  07-15-23 @ 06:01  13.0  07-14-23 @ 14:18        07-15    139  |  104  |  7<L>  ----------------------------<  103<H>  3.7   |  24  |  0.5<L>    Ca    8.3<L>      15 Jul 2023 06:01  Mg     2.1     07-15    TPro  6.1  /  Alb  3.6  /  TBili  0.3  /  DBili  x   /  AST  18  /  ALT  49<H>  /  AlkPhos  63  07-15    Liver panel trend:  TBili 0.3   /   AST 18   /   ALT 49   /   AlkP 63   /   Tptn 6.1   /   Alb 3.6    /   DBili --      07-15  TBili 0.3   /   AST 85   /   ALT 75   /   AlkP 47   /   Tptn 8.0   /   Alb 4.2    /   DBili --      07-14              Radiology:  CT Abdomen and Pelvis w/ IV Cont:   ACC: [id number] EXAM:  CT ABDOMEN AND PELVIS IC   ORDERED BY: SOFIA MADISON     PROCEDURE DATE:  07/14/2023          INTERPRETATION:  CLINICAL HISTORY / REASON FOR EXAM: Abdominal pain.    TECHNIQUE: Contiguous axial CT images were obtained from the lower chest   to the pubic symphysis following administration of 95 mL Omnipaque 350   intravenous contrast, 5 mL discarded . Oral contrast was not   administered. Coronal and sagittal reformats were submitted for review.    COMPARISON CT: CT abdomen and pelvis 5/31/2021    FINDINGS:    LOWER CHEST: Bibasilar dependent atelectasis..    HEPATOBILIARY: No intrahepatic or extrahepatic biliary ductal dilation.   Mild hepatic steatosis.    SPLEEN: Unremarkable.    PANCREAS: Unremarkable.    ADRENALGLANDS: Unremarkable.    KIDNEYS: Symmetric renal enhancement. No hydronephrosis..    ABDOMINOPELVIC NODES: No enlarged abdominal or pelvic lymph nodes.    PELVIC ORGANS: Left ovarian follicle/cyst measuring 1.5 cm, which is   physiologic in a menstruating female. Otherwise unremarkable.    PERITONEUM/MESENTERY/BOWEL: Scattered colonic diverticulosis. Thickened   sigmoid and distal descending colon wall with surrounding fat stranding,   compatible with diverticulitis/colitis. No drainable collection or   intramural abscess. No intraperitoneal free air is seen. No bowel   obstruction. Normal caliber appendix..    BONES/SOFT TISSUES: No acute osseous abnormality. Previously seen stable   sclerosis of the iliac bone, compatible with osteitis condensans ilii.    VASCULAR: The abdominal aorta is of normal caliber..      IMPRESSION:    Colonic diverticulosis with thickened sigmoid and distal descending   colonic wall with surrounding fat stranding, compatible with   diverticulitis/colitis. No drainable collection.    --- End of Report ---          CHIP MCDONALD MD; Resident Radiologist  This document has been electronically signed.  JOYCE LEONARD MD; Attending Interventional Radiologist  This document has been electronically signed. Jul [id number]  5:27PM (07-14-23 @ 16:10)

## 2023-07-15 NOTE — CONSULT NOTE ADULT - ASSESSMENT
A 43y F pmh fibroids, ovarian cyst, UTI, nephrolithiasis, b/r tubal ligation 13 years ago presented to the ED complaining of LLQ abdominal pain x3 days, acute onset , progressively worse, aggravated with movement and a/w nausea and 3 episodes of loose stool. The patient also complained of dysuria and vaginal discharge x1 week. The patient had similar complains for 1 day about a week back. Patient admits to being sexually active with one partner.  Denies fevers, chills, diarrhea, hematuria, melena, vomiting, hematemesis. chest pain, SOB, headache, dizziness or LOC. GI was consulted for management of diverticulitis. no previous hx of similar episodes or hx of colonoscopies or family hx of gastric or colon cancer or ibd    #Acute Diverticulitis - Uncomplicated  - no evidence of sepsis  - Last Colonoscopy: none  - CTAP:  as above    Rec  - Clear liquid diet  - please send GI PCR, C. Diff and Fecal calprotectin  -- if pain improves and pt has bms can advance diet  - IV Fluids  - IV ZOfran PRN for nausea/vomiting  - IV Cipro and Flagyl  - Tylenol prn for mild/moderate pain  - Morphine PRN for severe pain - please use sparingly  - Pt will need Colonoscopy in 6-8 weeks once diverticulitis heals    A 43y F pmh fibroids, ovarian cyst, UTI, nephrolithiasis, b/r tubal ligation 13 years ago presented to the ED complaining of LLQ abdominal pain x3 days, acute onset , progressively worse, aggravated with movement and a/w nausea and 3 episodes of loose stool. The patient also complained of dysuria and vaginal discharge x1 week. The patient had similar complains for 1 day about a week back. Patient admits to being sexually active with one partner.  Denies fevers, chills, diarrhea, hematuria, melena, vomiting, hematemesis. chest pain, SOB, headache, dizziness or LOC. GI was consulted for management of diverticulitis. no previous hx of similar episodes or hx of colonoscopies or family hx of gastric or colon cancer or ibd    #Acute Diverticulitis - Uncomplicated  - no evidence of sepsis  - Last Colonoscopy: none  - CTAP:  as above    Rec  - Clear liquid diet  - please send GI PCR, C. Diff and Fecal calprotectin  -- if pain improves and pt has bms can advance diet  - IV Fluids  - IV ZOfran PRN for nausea/vomiting of no contraindication   - broad spectrum antibiotics   - pain control  - Pt will need Colonoscopy in 6-8 weeks once diverticulitis heals   - Follow-up with our [facility] [phone]

## 2023-07-15 NOTE — PATIENT PROFILE ADULT - NSPROGENOTHERPROVIDER_GEN_A_NUR
"Izabella Og's goals for this visit include: Follow up   She requests these members of her care team be copied on today's visit information:     PCP: Jeff Olson    Referring Provider:  No referring provider defined for this encounter.    /70   Pulse 75   Ht 1.702 m (5' 7\")   Wt 85.3 kg (188 lb)   SpO2 99%   BMI 29.44 kg/m      Do you need any medication refills at today's visit? No    Rosalba Deleon CMA    " none

## 2023-07-15 NOTE — PATIENT PROFILE ADULT - NSPROIMPLANTSMEDDEV_GEN_A_NUR
None Hydroxychloroquine Counseling:  I discussed with the patient that a baseline ophthalmologic exam is needed at the start of therapy and every year thereafter while on therapy. A CBC may also be warranted for monitoring.  The side effects of this medication were discussed with the patient, including but not limited to agranulocytosis, aplastic anemia, seizures, rashes, retinopathy, and liver toxicity. Patient instructed to call the office should any adverse effect occur.  The patient verbalized understanding of the proper use and possible adverse effects of Plaquenil.  All the patient's questions and concerns were addressed.

## 2023-07-15 NOTE — CONSULT NOTE ADULT - ATTENDING COMMENTS
patient with uncomplicated diverticulitis, feeling better, abdomen tender to deep palpation at LLQ. Recommend clear liquid diet and advance as tolerated. Broad spectrum antibiotics. Colonoscopy in 8 weeks patient with uncomplicated diverticulitis, feeling better, abdomen tender to deep palpation at LLQ. Recommend clear liquid diet and advance as tolerated. Stool studies. Broad spectrum antibiotics. Colonoscopy in 8 weeks

## 2023-07-15 NOTE — PROGRESS NOTE ADULT - ASSESSMENT
43y F pmh fibroids, ovarian cyst, UTI, nephrolithiasis, b/r tubal ligation 13 years ago presented to the ED complaining of LLQ abdominal pain x3 days, acute onset , progressively worse, aggravated with movement and a/w nausea and 3 episodes of loose stool. The patient also complained of dysuria and vaginal discharge x1 week. The patient had similar complains for 1 day about a week back.    #Acute diverticulitis/Colitis  -CT Abdomen and Pelvis w/ IV Cont: Colonic diverticulosis with thickened sigmoid and distal descending colonic wall with surrounding fat stranding, compatible with diverticulitis/colitis. No drainable collection.  -F/U GI PCR; send c. diff, fecal calprotectin  -still having abdominal pain, though slightly improved today; had 5-6 BM's over last 24 hours  -spoke with GI fellow, will c/w clear liquid diet for today  -c/w rocephin and flagyl for now  -OP Follow up in 4-6 weeks with GI for colonoscopy  -pain control  -c/w IVF   -official GI consult pending, f/u rest of recs     #Uncomplicated UTI  -multiple episodes in last 2 yrs  -will continue with rocephin for now   -f/u urine cx    #Hyponatremia- resolved   -c/w hydration and liquid diet     #Transaminitis  -improving   -no c/o RUQ pain  -monitor LFT's     #DVT PPx: Heparin 5000u sq q12h     #Progress Note Handoff  Pending (specify):  Resolution of diarrhea/abd pain, send stool studies, f/u GI recs   Family discussion: Patient well-informed and engaged in their care. In agreement.  Disposition: Home when stable.

## 2023-07-15 NOTE — PATIENT PROFILE ADULT - FUNCTIONAL ASSESSMENT - BASIC MOBILITY SECTION LABEL
Infusion Nursing Note:  Rowan Leiva presents today for venofer.    Patient seen by provider today: No   present during visit today: Not Applicable.    Note: N/A.      Intravenous Access:  Peripheral IV placed.    Treatment Conditions:  Not Applicable.      Post Infusion Assessment:  Patient tolerated infusion without incident.  Site patent and intact, free from redness, edema or discomfort.  No evidence of extravasations.  Access discontinued per protocol.       Discharge Plan:   Patient and/or family verbalized understanding of discharge instructions and all questions answered.  AVS to patient via [employer].  Patient will return 8/16 for next appointment.   Patient discharged in stable condition accompanied by: self.  Departure Mode: Ambulatory.      Viktoria Romero RN                      
.

## 2023-07-15 NOTE — PROVIDER CONTACT NOTE (OTHER) - SITUATION
Pt is requesting pain medication. She has stat Toradol order from 11pm that was not given. Should I give now?

## 2023-07-16 LAB
ALBUMIN SERPL ELPH-MCNC: 3.5 G/DL — SIGNIFICANT CHANGE UP (ref 3.5–5.2)
ALP SERPL-CCNC: 58 U/L — SIGNIFICANT CHANGE UP (ref 30–115)
ALT FLD-CCNC: 49 U/L — HIGH (ref 0–41)
ANION GAP SERPL CALC-SCNC: 7 MMOL/L — SIGNIFICANT CHANGE UP (ref 7–14)
AST SERPL-CCNC: 24 U/L — SIGNIFICANT CHANGE UP (ref 0–41)
BASOPHILS # BLD AUTO: 0.05 K/UL — SIGNIFICANT CHANGE UP (ref 0–0.2)
BASOPHILS NFR BLD AUTO: 0.7 % — SIGNIFICANT CHANGE UP (ref 0–1)
BILIRUB SERPL-MCNC: <0.2 MG/DL — SIGNIFICANT CHANGE UP (ref 0.2–1.2)
BUN SERPL-MCNC: 7 MG/DL — LOW (ref 10–20)
CALCIUM SERPL-MCNC: 8.4 MG/DL — SIGNIFICANT CHANGE UP (ref 8.4–10.5)
CHLORIDE SERPL-SCNC: 109 MMOL/L — SIGNIFICANT CHANGE UP (ref 98–110)
CO2 SERPL-SCNC: 24 MMOL/L — SIGNIFICANT CHANGE UP (ref 17–32)
CREAT SERPL-MCNC: 0.5 MG/DL — LOW (ref 0.7–1.5)
EGFR: 119 ML/MIN/1.73M2 — SIGNIFICANT CHANGE UP
EOSINOPHIL # BLD AUTO: 0.33 K/UL — SIGNIFICANT CHANGE UP (ref 0–0.7)
EOSINOPHIL NFR BLD AUTO: 4.6 % — SIGNIFICANT CHANGE UP (ref 0–8)
EPEC DNA STL QL NAA+PROBE: DETECTED
GI PCR PANEL: DETECTED
GLUCOSE SERPL-MCNC: 104 MG/DL — HIGH (ref 70–99)
HCT VFR BLD CALC: 37.2 % — SIGNIFICANT CHANGE UP (ref 37–47)
HGB BLD-MCNC: 12.5 G/DL — SIGNIFICANT CHANGE UP (ref 12–16)
IMM GRANULOCYTES NFR BLD AUTO: 0.3 % — SIGNIFICANT CHANGE UP (ref 0.1–0.3)
LYMPHOCYTES # BLD AUTO: 2.13 K/UL — SIGNIFICANT CHANGE UP (ref 1.2–3.4)
LYMPHOCYTES # BLD AUTO: 29.6 % — SIGNIFICANT CHANGE UP (ref 20.5–51.1)
MAGNESIUM SERPL-MCNC: 2.1 MG/DL — SIGNIFICANT CHANGE UP (ref 1.8–2.4)
MCHC RBC-ENTMCNC: 31 PG — SIGNIFICANT CHANGE UP (ref 27–31)
MCHC RBC-ENTMCNC: 33.6 G/DL — SIGNIFICANT CHANGE UP (ref 32–37)
MCV RBC AUTO: 92.3 FL — SIGNIFICANT CHANGE UP (ref 81–99)
MONOCYTES # BLD AUTO: 0.84 K/UL — HIGH (ref 0.1–0.6)
MONOCYTES NFR BLD AUTO: 11.7 % — HIGH (ref 1.7–9.3)
NEUTROPHILS # BLD AUTO: 3.82 K/UL — SIGNIFICANT CHANGE UP (ref 1.4–6.5)
NEUTROPHILS NFR BLD AUTO: 53.1 % — SIGNIFICANT CHANGE UP (ref 42.2–75.2)
NRBC # BLD: 0 /100 WBCS — SIGNIFICANT CHANGE UP (ref 0–0)
PLATELET # BLD AUTO: 327 K/UL — SIGNIFICANT CHANGE UP (ref 130–400)
PMV BLD: 10.1 FL — SIGNIFICANT CHANGE UP (ref 7.4–10.4)
POTASSIUM SERPL-MCNC: 4 MMOL/L — SIGNIFICANT CHANGE UP (ref 3.5–5)
POTASSIUM SERPL-SCNC: 4 MMOL/L — SIGNIFICANT CHANGE UP (ref 3.5–5)
PROT SERPL-MCNC: 6 G/DL — SIGNIFICANT CHANGE UP (ref 6–8)
RBC # BLD: 4.03 M/UL — LOW (ref 4.2–5.4)
RBC # FLD: 14.1 % — SIGNIFICANT CHANGE UP (ref 11.5–14.5)
SODIUM SERPL-SCNC: 140 MMOL/L — SIGNIFICANT CHANGE UP (ref 135–146)
WBC # BLD: 7.19 K/UL — SIGNIFICANT CHANGE UP (ref 4.8–10.8)
WBC # FLD AUTO: 7.19 K/UL — SIGNIFICANT CHANGE UP (ref 4.8–10.8)

## 2023-07-16 PROCEDURE — 99232 SBSQ HOSP IP/OBS MODERATE 35: CPT

## 2023-07-16 RX ADMIN — Medication 15 MILLIGRAM(S): at 18:09

## 2023-07-16 RX ADMIN — Medication 15 MILLIGRAM(S): at 18:42

## 2023-07-16 RX ADMIN — CEFTRIAXONE 100 MILLIGRAM(S): 500 INJECTION, POWDER, FOR SOLUTION INTRAMUSCULAR; INTRAVENOUS at 05:31

## 2023-07-16 RX ADMIN — Medication 100 MILLIGRAM(S): at 15:10

## 2023-07-16 RX ADMIN — Medication 100 MILLIGRAM(S): at 22:05

## 2023-07-16 RX ADMIN — Medication 100 MILLIGRAM(S): at 05:28

## 2023-07-16 RX ADMIN — Medication 15 MILLIGRAM(S): at 05:29

## 2023-07-16 RX ADMIN — PANTOPRAZOLE SODIUM 40 MILLIGRAM(S): 20 TABLET, DELAYED RELEASE ORAL at 05:31

## 2023-07-16 RX ADMIN — HEPARIN SODIUM 5000 UNIT(S): 5000 INJECTION INTRAVENOUS; SUBCUTANEOUS at 18:10

## 2023-07-16 RX ADMIN — HEPARIN SODIUM 5000 UNIT(S): 5000 INJECTION INTRAVENOUS; SUBCUTANEOUS at 05:29

## 2023-07-16 RX ADMIN — Medication 650 MILLIGRAM(S): at 23:19

## 2023-07-16 NOTE — PROGRESS NOTE ADULT - ASSESSMENT
43y F pmh fibroids, ovarian cyst, UTI, nephrolithiasis, b/r tubal ligation 13 years ago presented to the ED complaining of LLQ abdominal pain x3 days, acute onset , progressively worse, aggravated with movement and a/w nausea and 3 episodes of loose stool. The patient also complained of dysuria and vaginal discharge x1 week. The patient had similar complains for 1 day about a week back.    #Acute diverticulitis/Colitis  #EPEC   -CT Abdomen and Pelvis w/ IV Cont: Colonic diverticulosis with thickened sigmoid and distal descending colonic wall with surrounding fat stranding, compatible with diverticulitis/colitis. No drainable collection.  -C. diff negative  -f/u fecal calprotectin  -GI PCR positive for EPEC   -still having abdominal pain, though again slightly improved today; had 3 BM's over last 24 hours  -spoke with GI fellow, can increase diet to soft; d/c fluids   -c/w rocephin and flagyl for now  -OP Follow up in 4-6 weeks with GI for colonoscopy  -pain control    #Hyponatremia- resolved   -c/w hydration and liquid diet     #Transaminitis  -improving   -no c/o RUQ pain  -monitor LFT's     #DVT PPx: Heparin 5000u sq q12h     #Progress Note Handoff  Pending (specify):  Resolution of diarrhea/abd pain, monitor on soft diet   Family discussion: Patient well-informed and engaged in their care. In agreement.  Disposition: Home when stable, likely tomorrow.  43y F pmh fibroids, ovarian cyst, UTI, nephrolithiasis, b/r tubal ligation 13 years ago presented to the ED complaining of LLQ abdominal pain x3 days, acute onset , progressively worse, aggravated with movement and a/w nausea and 3 episodes of loose stool. The patient also complained of dysuria and vaginal discharge x1 week. The patient had similar complains for 1 day about a week back.    #Acute diverticulitis/Colitis  #EPEC   -CT Abdomen and Pelvis w/ IV Cont: Colonic diverticulosis with thickened sigmoid and distal descending colonic wall with surrounding fat stranding, compatible with diverticulitis/colitis. No drainable collection.  -C. diff negative  -f/u fecal calprotectin  -GI PCR positive for EPEC   -still having abdominal pain, though again slightly improved today; had 3 BM's over last 24 hours  -spoke with GI fellow, can increase diet to soft; d/c fluids   -c/w rocephin and flagyl for now  -OP Follow up in 4-6 weeks with GI for colonoscopy  -pain control  -DO NOT GIVE IMODIUM     #Hyponatremia- resolved   -c/w hydration and liquid diet     #Transaminitis  -improving   -no c/o RUQ pain  -monitor LFT's     #DVT PPx: Heparin 5000u sq q12h     #Progress Note Handoff  Pending (specify):  Resolution of diarrhea/abd pain, monitor on soft diet   Family discussion: Patient well-informed and engaged in their care. In agreement.  Disposition: Home when stable, likely tomorrow.

## 2023-07-17 ENCOUNTER — TRANSCRIPTION ENCOUNTER (OUTPATIENT)
Age: 43
End: 2023-07-17

## 2023-07-17 LAB
ALBUMIN SERPL ELPH-MCNC: 3.7 G/DL — SIGNIFICANT CHANGE UP (ref 3.5–5.2)
ALP SERPL-CCNC: 64 U/L — SIGNIFICANT CHANGE UP (ref 30–115)
ALT FLD-CCNC: 50 U/L — HIGH (ref 0–41)
ANION GAP SERPL CALC-SCNC: 10 MMOL/L — SIGNIFICANT CHANGE UP (ref 7–14)
AST SERPL-CCNC: 24 U/L — SIGNIFICANT CHANGE UP (ref 0–41)
BASOPHILS # BLD AUTO: 0.05 K/UL — SIGNIFICANT CHANGE UP (ref 0–0.2)
BASOPHILS NFR BLD AUTO: 0.7 % — SIGNIFICANT CHANGE UP (ref 0–1)
BILIRUB SERPL-MCNC: <0.2 MG/DL — SIGNIFICANT CHANGE UP (ref 0.2–1.2)
BUN SERPL-MCNC: 12 MG/DL — SIGNIFICANT CHANGE UP (ref 10–20)
CALCIUM SERPL-MCNC: 8.7 MG/DL — SIGNIFICANT CHANGE UP (ref 8.4–10.4)
CHLORIDE SERPL-SCNC: 106 MMOL/L — SIGNIFICANT CHANGE UP (ref 98–110)
CO2 SERPL-SCNC: 24 MMOL/L — SIGNIFICANT CHANGE UP (ref 17–32)
CREAT SERPL-MCNC: 0.6 MG/DL — LOW (ref 0.7–1.5)
EGFR: 114 ML/MIN/1.73M2 — SIGNIFICANT CHANGE UP
EOSINOPHIL # BLD AUTO: 0.39 K/UL — SIGNIFICANT CHANGE UP (ref 0–0.7)
EOSINOPHIL NFR BLD AUTO: 5.2 % — SIGNIFICANT CHANGE UP (ref 0–8)
GLUCOSE SERPL-MCNC: 102 MG/DL — HIGH (ref 70–99)
HCT VFR BLD CALC: 36.1 % — LOW (ref 37–47)
HGB BLD-MCNC: 12.2 G/DL — SIGNIFICANT CHANGE UP (ref 12–16)
IMM GRANULOCYTES NFR BLD AUTO: 0.1 % — SIGNIFICANT CHANGE UP (ref 0.1–0.3)
LYMPHOCYTES # BLD AUTO: 2.35 K/UL — SIGNIFICANT CHANGE UP (ref 1.2–3.4)
LYMPHOCYTES # BLD AUTO: 31.1 % — SIGNIFICANT CHANGE UP (ref 20.5–51.1)
MCHC RBC-ENTMCNC: 31.2 PG — HIGH (ref 27–31)
MCHC RBC-ENTMCNC: 33.8 G/DL — SIGNIFICANT CHANGE UP (ref 32–37)
MCV RBC AUTO: 92.3 FL — SIGNIFICANT CHANGE UP (ref 81–99)
MONOCYTES # BLD AUTO: 0.79 K/UL — HIGH (ref 0.1–0.6)
MONOCYTES NFR BLD AUTO: 10.5 % — HIGH (ref 1.7–9.3)
NEUTROPHILS # BLD AUTO: 3.96 K/UL — SIGNIFICANT CHANGE UP (ref 1.4–6.5)
NEUTROPHILS NFR BLD AUTO: 52.4 % — SIGNIFICANT CHANGE UP (ref 42.2–75.2)
NRBC # BLD: 0 /100 WBCS — SIGNIFICANT CHANGE UP (ref 0–0)
PLATELET # BLD AUTO: 338 K/UL — SIGNIFICANT CHANGE UP (ref 130–400)
PMV BLD: 10.6 FL — HIGH (ref 7.4–10.4)
POTASSIUM SERPL-MCNC: 4.1 MMOL/L — SIGNIFICANT CHANGE UP (ref 3.5–5)
POTASSIUM SERPL-SCNC: 4.1 MMOL/L — SIGNIFICANT CHANGE UP (ref 3.5–5)
PROT SERPL-MCNC: 6.3 G/DL — SIGNIFICANT CHANGE UP (ref 6–8)
RBC # BLD: 3.91 M/UL — LOW (ref 4.2–5.4)
RBC # FLD: 13.8 % — SIGNIFICANT CHANGE UP (ref 11.5–14.5)
SODIUM SERPL-SCNC: 140 MMOL/L — SIGNIFICANT CHANGE UP (ref 135–146)
WBC # BLD: 7.55 K/UL — SIGNIFICANT CHANGE UP (ref 4.8–10.8)
WBC # FLD AUTO: 7.55 K/UL — SIGNIFICANT CHANGE UP (ref 4.8–10.8)

## 2023-07-17 PROCEDURE — 99232 SBSQ HOSP IP/OBS MODERATE 35: CPT

## 2023-07-17 RX ORDER — CIPROFLOXACIN LACTATE 400MG/40ML
1 VIAL (ML) INTRAVENOUS
Qty: 14 | Refills: 0
Start: 2023-07-17 | End: 2023-07-23

## 2023-07-17 RX ORDER — METRONIDAZOLE 500 MG
1 TABLET ORAL
Qty: 21 | Refills: 0
Start: 2023-07-17 | End: 2023-07-23

## 2023-07-17 RX ORDER — SODIUM CHLORIDE 9 MG/ML
1000 INJECTION INTRAMUSCULAR; INTRAVENOUS; SUBCUTANEOUS
Refills: 0 | Status: DISCONTINUED | OUTPATIENT
Start: 2023-07-17 | End: 2023-07-18

## 2023-07-17 RX ORDER — LANOLIN ALCOHOL/MO/W.PET/CERES
1 CREAM (GRAM) TOPICAL
Qty: 0 | Refills: 0 | DISCHARGE
Start: 2023-07-17

## 2023-07-17 RX ADMIN — CEFTRIAXONE 100 MILLIGRAM(S): 500 INJECTION, POWDER, FOR SOLUTION INTRAMUSCULAR; INTRAVENOUS at 04:47

## 2023-07-17 RX ADMIN — Medication 15 MILLIGRAM(S): at 05:21

## 2023-07-17 RX ADMIN — Medication 100 MILLIGRAM(S): at 05:21

## 2023-07-17 RX ADMIN — Medication 100 MILLIGRAM(S): at 21:17

## 2023-07-17 RX ADMIN — Medication 15 MILLIGRAM(S): at 06:00

## 2023-07-17 RX ADMIN — HEPARIN SODIUM 5000 UNIT(S): 5000 INJECTION INTRAVENOUS; SUBCUTANEOUS at 05:22

## 2023-07-17 RX ADMIN — Medication 15 MILLIGRAM(S): at 18:11

## 2023-07-17 RX ADMIN — SODIUM CHLORIDE 100 MILLILITER(S): 9 INJECTION INTRAMUSCULAR; INTRAVENOUS; SUBCUTANEOUS at 10:57

## 2023-07-17 RX ADMIN — PANTOPRAZOLE SODIUM 40 MILLIGRAM(S): 20 TABLET, DELAYED RELEASE ORAL at 05:22

## 2023-07-17 RX ADMIN — Medication 100 MILLIGRAM(S): at 13:14

## 2023-07-17 RX ADMIN — Medication 15 MILLIGRAM(S): at 19:27

## 2023-07-17 RX ADMIN — Medication 650 MILLIGRAM(S): at 00:25

## 2023-07-17 RX ADMIN — HEPARIN SODIUM 5000 UNIT(S): 5000 INJECTION INTRAVENOUS; SUBCUTANEOUS at 18:11

## 2023-07-17 NOTE — DISCHARGE NOTE PROVIDER - NPI NUMBER (FOR [employer] USE ONLY) :
[[id number] [[id number]],[[id number] [[id number]],[[id number] [[id number] [[id number]],[[id number]] [[id number]],[[id number]]

## 2023-07-17 NOTE — DISCHARGE NOTE PROVIDER - PROVIDER TOKENS
PROVIDER:[TOKEN:[[id number]],FOLLOWUP:[1 week]],PROVIDER:[TOKEN:[[id number]],FOLLOWUP:[1 month]] PROVIDER:[TOKEN:[[id number]],FOLLOWUP:[1 week]],PROVIDER:[TOKEN:[[id number]],FOLLOWUP:[1 month]] PROVIDER:[TOKEN:[[id number]],FOLLOWUP:[1 week]],PROVIDER:[TOKEN:[[id number]],FOLLOWUP:[1 month]] PROVIDER:[TOKEN:[[id number]],FOLLOWUP:[2 weeks]],PROVIDER:[TOKEN:[[id number]],FOLLOWUP:[1 month]] PROVIDER:[TOKEN:[[id number]],FOLLOWUP:[2 weeks]],PROVIDER:[TOKEN:[[id number]],FOLLOWUP:[1 month]] PROVIDER:[TOKEN:[[id number]],FOLLOWUP:[2 weeks]],PROVIDER:[TOKEN:[[id number]],FOLLOWUP:[1 month]]

## 2023-07-17 NOTE — DISCHARGE NOTE PROVIDER - HOSPITAL COURSE
43 y.o. female with PMH of fibroids, ovarian cyst, UTI, nephrolithiasis, b/l tubal ligation 12 years ago presented to the ED complaining of LLQ abdominal pain x 3 days, acute in onset, progressively worsening, and aggregated with movement, associated with nausea & 3 episodes of loose stool. The patient also endorsed dysuria and vaginal discharge x 1 week. Patient has had similar complaints about 1 week ago for 1 day.   Acute Diverticulitis was managed with IV hydration, Rocephin and Flagyl, pain control, and GI PCR for C. difficile, fecal calprotectin were sent. GI was consulted. Patient was also found to have uncomplicated UTI that was treated with Rocephin.   Course was complicated by hyponatremia that was managed with hydration and liquid diet and transaminitis with patient denying RUQ pain that we managed with continued monitoring of LFTs.     Patient is currently tolerating diet and diarrhea/abdominal pain have improved.      43 y.o. female with PMH of fibroids, ovarian cyst, UTI, nephrolithiasis, b/l tubal ligation 12 years ago presented to the ED complaining of LLQ abdominal pain x 3 days, acute in onset, progressively worsening, and aggregated with movement, associated with nausea & 3 episodes of loose stool. The patient also endorsed dysuria and vaginal discharge x 1 week. Patient has had similar complaints about 1 week ago for 1 day.     #ED vitals:  T(C): 36.8 (23 @ 12:20), Max: 36.8 (23 @ 12:20)  HR: 79 (23 @ 12:20) (79 - 79)  BP: 144/73 (23 @ 12:20) (144/73 - 144/73)  RR: 18 (23 @ 12:20) (18 - 18)  SpO2: 98% (23 @ 12:20) (98% - 98%)    #Labs significant for:  WBC 15.5, Na 130, AST 85, ALT 75, UA: LE pos, WBC 11    #Imagin)CT Abdomen and Pelvis w/ IV Cont:  Colonic diverticulosis with thickened sigmoid and distal descending colonic wall with surrounding fat stranding, compatible with diverticulitis/colitis. No drainable collection.    2)US Transvaginal  No acute sonographic abnormality. Uterine fibroid, as described.    #S/P: Ceftriaxone 1gm IV x 1, Cipro 400 IV x 1, Flagyl 500 IV x 1, NS bolus 1000cc, Morphine 4mg IV x 2 in the ED.    The patient was admitted to medicine for the management of acute diverticulitis and uncomplicated UTI.      Acute Diverticulitis was managed with IV hydration, Rocephin and Flagyl, pain control, and GI PCR for C. difficile, fecal calprotectin were sent. GI was consulted. Patient was also found to have uncomplicated UTI that was treated with Rocephin.   Course was complicated by hyponatremia that was managed with hydration and liquid diet and transaminitis with patient denying RUQ pain that we managed with continued monitoring of LFTs.     Patient is currently tolerating diet and diarrhea/abdominal pain have improved.      43 y.o. female with PMH of fibroids, ovarian cyst, UTI, nephrolithiasis, b/l tubal ligation 12 years ago presented to the ED complaining of LLQ abdominal pain x 3 days, acute in onset, progressively worsening, and aggregated with movement, associated with nausea & 3 episodes of loose stool. The patient also endorsed dysuria and vaginal discharge x 1 week. Patient has had similar complaints about 1 week ago for 1 day.     #ED vitals:  T(C): 36.8 (23 @ 12:20), Max: 36.8 (23 @ 12:20)  HR: 79 (23 @ 12:20) (79 - 79)  BP: 144/73 (23 @ 12:20) (144/73 - 144/73)  RR: 18 (23 @ 12:20) (18 - 18)  SpO2: 98% (23 @ 12:20) (98% - 98%)    #Labs significant for:  WBC 15.5, Na 130, AST 85, ALT 75, UA: LE pos, WBC 11    #Imagin)CT Abdomen and Pelvis w/ IV Cont:  Colonic diverticulosis with thickened sigmoid and distal descending colonic wall with surrounding fat stranding, compatible with diverticulitis/colitis. No drainable collection.    2)US Transvaginal  No acute sonographic abnormality. Uterine fibroid, as described.    #S/P: Ceftriaxone 1gm IV x 1, Cipro 400 IV x 1, Flagyl 500 IV x 1, NS bolus 1000cc, Morphine 4mg IV x 2 in the ED.    The patient was admitted to medicine for the management of acute diverticulitis and uncomplicated UTI.      Acute Diverticulitis was managed with IV hydration, Rocephin and Flagyl, pain control, and GI PCR for C. difficile, fecal calprotectin were sent. GI was consulted. Patient was also found to have uncomplicated UTI that was treated with Rocephin/flagyl.  Transaminitis with patient denying RUQ pain that we managed with continued monitoring of LFTs, now improved.     Patient is currently tolerating diet and diarrhea/abdominal pain have improved.

## 2023-07-17 NOTE — PROGRESS NOTE ADULT - ASSESSMENT
43y F pmh fibroids, ovarian cyst, UTI, nephrolithiasis, b/r tubal ligation 13 years ago presented to the ED complaining of LLQ abdominal pain x3 days, acute onset , progressively worse, aggravated with movement and a/w nausea and 3 episodes of loose stool. The patient also complained of dysuria and vaginal discharge x1 week. The patient had similar complains for 1 day about a week back.    #Acute diverticulitis/Colitis  #EPEC   -CT Abdomen and Pelvis w/ IV Cont: Colonic diverticulosis with thickened sigmoid and distal descending colonic wall with surrounding fat stranding, compatible with diverticulitis/colitis. No drainable collection.  -C. diff negative  -f/u fecal calprotectin  -GI PCR positive for EPEC   -still having some abdominal pain, and now some flank pain on left; had 5 liquid BM's over last 24 hours; also endorsed dizziness; I think that she's not eating and drinking as much as she says she is; I'm going to put her back on fluids and give her another day of abx to see if she improves; abd exam no swelling, just mild ttp in llq and left flank   -tolerating soft diet, can improve to regular diet   -c/w rocephin and flagyl for now  -OP Follow up in 4-6 weeks with GI for colonoscopy  -pain control  -DO NOT GIVE IMODIUM     #Hyponatremia- resolved   -c/w hydration and liquid diet     #Transaminitis  -improving   -no c/o RUQ pain  -monitor LFT's     #DVT PPx: Heparin 5000u sq q12h     #Progress Note Handoff  Pending (specify):  Resolution of diarrhea/abd pain, monitor on regular diet   Family discussion: Patient well-informed and engaged in their care. In agreement.  Disposition: Home when stable, likely tomorrow.

## 2023-07-17 NOTE — DISCHARGE NOTE PROVIDER - NSDCCPCAREPLAN_GEN_ALL_CORE_FT
PRINCIPAL DISCHARGE DIAGNOSIS  Diagnosis: Diverticulitis  Assessment and Plan of Treatment: Patient was instructed to finish course of antibiotics and to follow-up with GI in 4-6 weeks.     PRINCIPAL DISCHARGE DIAGNOSIS  Diagnosis: Diverticulitis  Assessment and Plan of Treatment: You came to the ED with abdominal pain. You were admitted to the hospital for management of acute diverticulitis/UTI. You received antibiotics and intravenous fluids. Please continue and complete the course of antibiotics and follow-up with GI in 4-6 weeks.     PRINCIPAL DISCHARGE DIAGNOSIS  Diagnosis: Diverticulitis  Assessment and Plan of Treatment: You came to the ED with abdominal pain. You were admitted to the hospital for management of acute diverticulitis/UTI. You received antibiotics and intravenous fluids. Please continue and complete the antibiotics course of Flagyl and Ciproflaxacin and follow-up with GI in 4-6 weeks.     PRINCIPAL DISCHARGE DIAGNOSIS  Diagnosis: Diverticulitis  Assessment and Plan of Treatment: You came to the ED with abdominal pain. You were admitted to the hospital for management of acute diverticulitis/UTI. You received antibiotics and intravenous fluids. Please complete antibiotic course Flagyl and Ciproflaxacin for 6 days and follow-up with GI for colonoscopy in 1 month.     PRINCIPAL DISCHARGE DIAGNOSIS  Diagnosis: Diverticulitis  Assessment and Plan of Treatment: You came to the ED with abdominal pain. You were admitted to the hospital for management of acute diverticulitis. You received antibiotics and intravenous fluids. Please complete antibiotic course Flagyl and  Ciproflaxacin for 6 days and follow-up with GI for colonoscopy in 1 month.

## 2023-07-17 NOTE — DISCHARGE NOTE PROVIDER - NSDCMRMEDTOKEN_GEN_ALL_CORE_FT
acetaminophen 325 mg oral tablet: 2 tab(s) orally every 6 hours  ciprofloxacin 500 mg oral tablet: 1 tab(s) orally 2 times a day  melatonin 3 mg oral tablet: 1 tab(s) orally once a day (at bedtime) As needed Insomnia  metroNIDAZOLE 500 mg oral tablet: 1 tab(s) orally 3 times a day  omeprazole 10 mg oral delayed release capsule: 1 cap(s) orally once a day  Tylenol 500 mg oral tablet: 1 tab(s) orally 3 times a day as needed for  mild pain   acetaminophen 325 mg oral tablet: 2 tab(s) orally every 6 hours  ciprofloxacin 500 mg oral tablet: 1 tab(s) orally 2 times a day  melatonin 3 mg oral tablet: 1 tab(s) orally once a day (at bedtime) As needed Insomnia  metroNIDAZOLE 500 mg oral tablet: 1 tab(s) orally 3 times a day  omeprazole 10 mg oral delayed release capsule: 1 cap(s) orally once a day

## 2023-07-17 NOTE — DISCHARGE NOTE PROVIDER - ATTENDING DISCHARGE PHYSICAL EXAMINATION:
PHYSICAL EXAM:  GENERAL: NAD, lying in bed comfortably  HEAD:  Atraumatic, Normocephalic  EYES: EOMI, PERRLA, conjunctiva and sclera clear  ENT: Moist mucous membranes  NECK: Supple, No JVD  CHEST/LUNG: Clear to auscultation bilaterally; No rales, rhonchi, wheezing, or rubs. Unlabored respirations  HEART: Regular rate and rhythm; No murmurs, rubs, or gallops  ABDOMEN: Bowel sounds present; Soft, mildly ttp in LLQ, mild flank pain   EXTREMITIES:  2+ Peripheral Pulses, brisk capillary refill. No clubbing, cyanosis, or edema  NERVOUS SYSTEM:  Alert & Oriented X3, speech clear. No deficits   MSK: FROM all 4 extremities, full and equal strength  SKIN: No rashes or lesions    Consultant(s) Notes Reviewed:  [x ] YES  [ ] NO  Care Discussed with Consultants/Other Providers [ x] YES  [ ] NO    Vital Signs Last 24 Hrs  T(C): 36.4 (18 Jul 2023 12:28), Max: 36.8 (17 Jul 2023 20:58)  T(F): 97.6 (18 Jul 2023 12:28), Max: 98.2 (17 Jul 2023 20:58)  HR: 70 (18 Jul 2023 12:28) (63 - 93)  BP: 119/68 (18 Jul 2023 12:28) (104/71 - 128/65)  BP(mean): --  RR: 18 (18 Jul 2023 12:28) (18 - 18)  SpO2: 96% (18 Jul 2023 12:28) (96% - 97%)    Parameters below as of 17 Jul 2023 16:26  Patient On (Oxygen Delivery Method): room air

## 2023-07-17 NOTE — DISCHARGE NOTE PROVIDER - CARE PROVIDER_API CALL
Eriberto Saravia.  Internal Medicine  [facility] [place] [address]  Phone: [phone]  Fax: [phone]  Follow Up Time: 1 week    Elvira Caldwell  [address] [place] [address]  Phone: [phone]  Fax: ()-  Follow Up Time: 1 month   Eriberto Saravia.  Internal Medicine  [facility] [place] [address]  Phone: [phone]  Fax: [phone]  Follow Up Time: 1 week    Elvira Caldwell  [address] [place] [address]  Phone: [phone]  Fax: ()-  Follow Up Time: 1 month   Eriberto Saravia.  Internal Medicine  [facility] [place] [address]  Phone: [phone]  Fax: [phone]  Follow Up Time: 1 week    Elvira Calwdell  [address] [place] [address]  Phone: [phone]  Fax: ()-  Follow Up Time: 1 month   Ranjeet Mercadoanor  Internal Medicine  N  SHAZIA LLOYD, Phys,    Phone: ()-  Fax: ()-  Follow Up Time: 2 weeks    Carline Simons  Gastroenterology  [address] [place] [address]  Phone: [phone]  Fax: [phone]  Follow Up Time: 1 month   Ranjeet Mercadoanor  Internal Medicine  N  SHAZIA LLOYD, Phys,    Phone: ()-  Fax: ()-  Follow Up Time: 2 weeks    Carline Simons  Gastroenterology  [address] [place] [address]  Phone: [phone]  Fax: [phone]  Follow Up Time: 1 month   Ranjeet Mercadoanor  Internal Medicine  N  SHAZIA LLOYD, Phys,    Phone: ()-  Fax: ()-  Follow Up Time: 2 weeks    Carline Simons  Gastroenterology  [address] [place] [address]  Phone: [phone]  Fax: [phone]  Follow Up Time: 1 month

## 2023-07-18 ENCOUNTER — TRANSCRIPTION ENCOUNTER (OUTPATIENT)
Age: 43
End: 2023-07-18

## 2023-07-18 VITALS
HEART RATE: 70 BPM | DIASTOLIC BLOOD PRESSURE: 68 MMHG | TEMPERATURE: 98 F | OXYGEN SATURATION: 96 % | RESPIRATION RATE: 18 BRPM | SYSTOLIC BLOOD PRESSURE: 119 MMHG

## 2023-07-18 LAB
ALBUMIN SERPL ELPH-MCNC: 3.6 G/DL — SIGNIFICANT CHANGE UP (ref 3.5–5.2)
ALP SERPL-CCNC: 66 U/L — SIGNIFICANT CHANGE UP (ref 30–115)
ALT FLD-CCNC: 46 U/L — HIGH (ref 0–41)
ANION GAP SERPL CALC-SCNC: 10 MMOL/L — SIGNIFICANT CHANGE UP (ref 7–14)
AST SERPL-CCNC: 24 U/L — SIGNIFICANT CHANGE UP (ref 0–41)
BILIRUB SERPL-MCNC: <0.2 MG/DL — SIGNIFICANT CHANGE UP (ref 0.2–1.2)
BUN SERPL-MCNC: 7 MG/DL — LOW (ref 10–20)
CALCIUM SERPL-MCNC: 8.7 MG/DL — SIGNIFICANT CHANGE UP (ref 8.4–10.5)
CALPROTECTIN STL-MCNT: 155 UG/G — HIGH (ref 0–120)
CHLORIDE SERPL-SCNC: 105 MMOL/L — SIGNIFICANT CHANGE UP (ref 98–110)
CO2 SERPL-SCNC: 23 MMOL/L — SIGNIFICANT CHANGE UP (ref 17–32)
CREAT SERPL-MCNC: 0.6 MG/DL — LOW (ref 0.7–1.5)
EGFR: 114 ML/MIN/1.73M2 — SIGNIFICANT CHANGE UP
GLUCOSE SERPL-MCNC: 104 MG/DL — HIGH (ref 70–99)
HCT VFR BLD CALC: 37.7 % — SIGNIFICANT CHANGE UP (ref 37–47)
HGB BLD-MCNC: 12.6 G/DL — SIGNIFICANT CHANGE UP (ref 12–16)
MAGNESIUM SERPL-MCNC: 1.9 MG/DL — SIGNIFICANT CHANGE UP (ref 1.8–2.4)
MCHC RBC-ENTMCNC: 30.4 PG — SIGNIFICANT CHANGE UP (ref 27–31)
MCHC RBC-ENTMCNC: 33.4 G/DL — SIGNIFICANT CHANGE UP (ref 32–37)
MCV RBC AUTO: 91.1 FL — SIGNIFICANT CHANGE UP (ref 81–99)
NRBC # BLD: 0 /100 WBCS — SIGNIFICANT CHANGE UP (ref 0–0)
PHOSPHATE SERPL-MCNC: 3.9 MG/DL — SIGNIFICANT CHANGE UP (ref 2.1–4.9)
PLATELET # BLD AUTO: 364 K/UL — SIGNIFICANT CHANGE UP (ref 130–400)
PMV BLD: 10.3 FL — SIGNIFICANT CHANGE UP (ref 7.4–10.4)
POTASSIUM SERPL-MCNC: 4.1 MMOL/L — SIGNIFICANT CHANGE UP (ref 3.5–5)
POTASSIUM SERPL-SCNC: 4.1 MMOL/L — SIGNIFICANT CHANGE UP (ref 3.5–5)
PROT SERPL-MCNC: 6.3 G/DL — SIGNIFICANT CHANGE UP (ref 6–8)
RBC # BLD: 4.14 M/UL — LOW (ref 4.2–5.4)
RBC # FLD: 13.4 % — SIGNIFICANT CHANGE UP (ref 11.5–14.5)
SODIUM SERPL-SCNC: 138 MMOL/L — SIGNIFICANT CHANGE UP (ref 135–146)
WBC # BLD: 6.71 K/UL — SIGNIFICANT CHANGE UP (ref 4.8–10.8)
WBC # FLD AUTO: 6.71 K/UL — SIGNIFICANT CHANGE UP (ref 4.8–10.8)

## 2023-07-18 PROCEDURE — 99239 HOSP IP/OBS DSCHRG MGMT >30: CPT

## 2023-07-18 RX ORDER — CIPROFLOXACIN LACTATE 400MG/40ML
1 VIAL (ML) INTRAVENOUS
Qty: 12 | Refills: 0
Start: 2023-07-18 | End: 2023-07-23

## 2023-07-18 RX ORDER — METRONIDAZOLE 500 MG
1 TABLET ORAL
Qty: 21 | Refills: 0
Start: 2023-07-18 | End: 2023-07-24

## 2023-07-18 RX ORDER — ACETAMINOPHEN 500 MG
1 TABLET ORAL
Refills: 0 | DISCHARGE

## 2023-07-18 RX ORDER — CIPROFLOXACIN LACTATE 400MG/40ML
1 VIAL (ML) INTRAVENOUS
Qty: 14 | Refills: 0
Start: 2023-07-18 | End: 2023-07-24

## 2023-07-18 RX ORDER — METRONIDAZOLE 500 MG
1 TABLET ORAL
Qty: 18 | Refills: 0
Start: 2023-07-18 | End: 2023-07-23

## 2023-07-18 RX ADMIN — PANTOPRAZOLE SODIUM 40 MILLIGRAM(S): 20 TABLET, DELAYED RELEASE ORAL at 06:13

## 2023-07-18 RX ADMIN — Medication 15 MILLIGRAM(S): at 07:02

## 2023-07-18 RX ADMIN — SODIUM CHLORIDE 100 MILLILITER(S): 9 INJECTION INTRAMUSCULAR; INTRAVENOUS; SUBCUTANEOUS at 04:59

## 2023-07-18 RX ADMIN — Medication 15 MILLIGRAM(S): at 06:14

## 2023-07-18 RX ADMIN — CEFTRIAXONE 100 MILLIGRAM(S): 500 INJECTION, POWDER, FOR SOLUTION INTRAMUSCULAR; INTRAVENOUS at 04:59

## 2023-07-18 RX ADMIN — HEPARIN SODIUM 5000 UNIT(S): 5000 INJECTION INTRAVENOUS; SUBCUTANEOUS at 06:13

## 2023-07-18 RX ADMIN — Medication 100 MILLIGRAM(S): at 06:12

## 2023-07-18 NOTE — PROGRESS NOTE ADULT - SUBJECTIVE AND OBJECTIVE BOX
MAURO ALBERTO  [facility] 0[address] ([facility])      Patient is a 43y old  Female who presents with a chief complaint of Diverticulitis/UTI (17 Jul 2023 16:00)        Interval events:  Patient seen and examined at bedside. No acute events overnight. No diarrhea today, had solid BM last night. No pain. No fevers. Stable for d/c.     -PMHx: No pertinent past medical history    UTI (urinary tract infection)    Ovarian cyst    Fibroids      -PSHx:No significant past surgical history            REVIEW OF SYSTEMS:  CONSTITUTIONAL: No fever, weight loss, or fatigue  RESPIRATORY: No cough, wheezing, chills or hemoptysis; No shortness of breath  CARDIOVASCULAR: No chest pain, palpitations, dizziness, or leg swelling  GASTROINTESTINAL: No abdominal or epigastric pain. No nausea, vomiting, or hematemesis; No diarrhea or constipation. No melena or hematochezia.  NEUROLOGICAL: No headaches  LYMPH NODES: No enlarged glands  MUSCULOSKELETAL: No joint pain or swelling; No muscle, back, or extremity pain      T(C): , Max: 36.8 (07-17-23 @ 20:58)  HR: 70 (07-18-23 @ 12:28)  BP: 119/68 (07-18-23 @ 12:28)  RR: 18 (07-18-23 @ 12:28)  SpO2: 96% (07-18-23 @ 12:28)  CAPILLARY BLOOD GLUCOSE          PHYSICAL EXAM:  GENERAL: NAD, lying in bed comfortably  HEAD:  Atraumatic, Normocephalic  EYES: EOMI, PERRLA, conjunctiva and sclera clear  ENT: Moist mucous membranes  NECK: Supple, No JVD  CHEST/LUNG: Clear to auscultation bilaterally; No rales, rhonchi, wheezing, or rubs. Unlabored respirations  HEART: Regular rate and rhythm; No murmurs, rubs, or gallops  ABDOMEN: Bowel sounds present; Soft, nt, nd   EXTREMITIES:  2+ Peripheral Pulses, brisk capillary refill. No clubbing, cyanosis, or edema  NERVOUS SYSTEM:  Alert & Oriented X3, speech clear. No deficits   MSK: FROM all 4 extremities, full and equal strength  SKIN: No rashes or lesions    Consultant(s) Notes Reviewed:  [x ] YES  [ ] NO  Care Discussed with Consultants/Other Providers [ x] YES  [ ] NO      LABS:          12.6  6.71  )-------(364          37.7  N=--  L=--  MCV=91.1    138|105|7<L>  ------------------<104<H>  4.1|23|0.6<L>  eGFR:--  Ca:8.7            Microbiology:    Culture - Blood (collected 07-15-23 @ 11:49)  Source: .Blood None  Preliminary Report (07-17-23 @ 20:01):    No growth at 48 Hours    Culture - Urine (collected 07-14-23 @ 14:18)  Source: Clean Catch Clean Catch (Midstream)  Final Report (07-15-23 @ 23:14):    <10,000 CFU/mL Normal Urogenital Ritu        RADIOLOGY & ADDITIONAL TESTS:        Medications:  acetaminophen     Tablet .. 650 milliGRAM(s) Oral every 6 hours PRN  aluminum hydroxide/magnesium hydroxide/simethicone Suspension 30 milliLiter(s) Oral every 4 hours PRN  cefTRIAXone   IVPB 1000 milliGRAM(s) IV Intermittent every 24 hours  heparin   Injectable 5000 Unit(s) SubCutaneous every 12 hours  ketorolac   Injectable 15 milliGRAM(s) IV Push two times a day  melatonin 3 milliGRAM(s) Oral at bedtime PRN  metroNIDAZOLE  IVPB 500 milliGRAM(s) IV Intermittent every 8 hours  ondansetron Injectable 4 milliGRAM(s) IV Push every 8 hours PRN  pantoprazole    Tablet 40 milliGRAM(s) Oral before breakfast      
  MAURO ALBERTO  [facility] [address] ([facility])      Patient is a 43y old  Female who presents with a chief complaint of Diverticulitis/UTI (14 Jul 2023 20:29)        Interval events:  Patient seen and examined at bedside. Says she has had about 5-6 BM's since yesterday. Still having some pain in LLQ. No vomiting, tolerating liquid diet.     -PMHx: No pertinent past medical history    UTI (urinary tract infection)    Ovarian cyst    Fibroids      -PSHx:No significant past surgical history            REVIEW OF SYSTEMS:  CONSTITUTIONAL: No fever, weight loss, or fatigue  RESPIRATORY: No cough, wheezing, chills or hemoptysis; No shortness of breath  CARDIOVASCULAR: No chest pain, palpitations, dizziness, or leg swelling  GASTROINTESTINAL: as above   NEUROLOGICAL: No headaches  LYMPH NODES: No enlarged glands  MUSCULOSKELETAL: No joint pain or swelling; No muscle, back, or extremity pain      T(C): , Max: 36.5 (07-15-23 @ 08:39)  HR: 65 (07-15-23 @ 08:39)  BP: 101/57 (07-15-23 @ 08:39)  RR: 18 (07-15-23 @ 08:39)  SpO2: 95% (07-15-23 @ 08:39)  CAPILLARY BLOOD GLUCOSE        PHYSICAL EXAM:  GENERAL: NAD, lying in bed comfortably  HEAD:  Atraumatic, Normocephalic  EYES: EOMI, PERRLA, conjunctiva and sclera clear  ENT: Moist mucous membranes  NECK: Supple, No JVD  CHEST/LUNG: Clear to auscultation bilaterally; No rales, rhonchi, wheezing, or rubs. Unlabored respirations  HEART: Regular rate and rhythm; No murmurs, rubs, or gallops  ABDOMEN: Bowel sounds present; Soft, ttp in LLQ  EXTREMITIES:  2+ Peripheral Pulses, brisk capillary refill. No clubbing, cyanosis, or edema  NERVOUS SYSTEM:  Alert & Oriented X3, speech clear. No deficits   MSK: FROM all 4 extremities, full and equal strength  SKIN: No rashes or lesions    Consultant(s) Notes Reviewed:  [x ] YES  [ ] NO  Care Discussed with Consultants/Other Providers [ x] YES  [ ] NO      LABS:          11.9  11.03  )-------(319          35.2  N=62.3  L=23.8  MCV=90.7          13.0  15.50  )-------(335          38.0  N=73.4  L=15.8  MCV=90.5    139|104|7<L>  ------------------<103<H>  3.7|24|0.5<L>  eGFR:--  Ca:8.3<L>  130<L>|101|11  ------------------<104<H>  TNP|24|0.7  eGFR:--  Ca:8.9            Microbiology:      RADIOLOGY & ADDITIONAL TESTS:  < from: CT Abdomen and Pelvis w/ IV Cont (07.14.23 @ 16:10) >    IMPRESSION:    Colonic diverticulosis with thickened sigmoid and distal descending   colonic wall with surrounding fat stranding, compatible with   diverticulitis/colitis. No drainable collection.    < end of copied text >        Medications:  acetaminophen     Tablet .. 650 milliGRAM(s) Oral every 6 hours PRN  aluminum hydroxide/magnesium hydroxide/simethicone Suspension 30 milliLiter(s) Oral every 4 hours PRN  cefTRIAXone   IVPB 1000 milliGRAM(s) IV Intermittent every 24 hours  dextrose 5% + sodium chloride 0.45%. 1000 milliLiter(s) IV Continuous <Continuous>  heparin   Injectable 5000 Unit(s) SubCutaneous every 12 hours  ketorolac   Injectable 15 milliGRAM(s) IV Push two times a day  melatonin 3 milliGRAM(s) Oral at bedtime PRN  metroNIDAZOLE  IVPB 500 milliGRAM(s) IV Intermittent every 8 hours  morphine  - Injectable 2 milliGRAM(s) IV Push every 4 hours PRN  ondansetron Injectable 4 milliGRAM(s) IV Push every 8 hours PRN  pantoprazole    Tablet 40 milliGRAM(s) Oral before breakfast      
  MAURO ALBERTO  [facility] [address] ([facility])      Patient is a 43y old  Female who presents with a chief complaint of Diverticulitis/UTI (17 Jul 2023 10:24)        Interval events:  Patient seen and examined at bedside. She says her LLQ pain is improved but is having some left flank pain. No fevers. Did have nausea yesterday but no vomiting. Also endorsed dizziness when standing yesterday. Had 5 episodes of diarrhea over last 24 hours.     -PMHx: No pertinent past medical history    UTI (urinary tract infection)    Ovarian cyst    Fibroids      -PSHx:No significant past surgical history            REVIEW OF SYSTEMS:  CONSTITUTIONAL: +dizziness, fatigue   RESPIRATORY: No cough, wheezing, chills or hemoptysis; No shortness of breath  CARDIOVASCULAR: No chest pain, palpitations, or leg swelling  GASTROINTESTINAL: +diarrhea, left flank pain   NEUROLOGICAL: No headaches  LYMPH NODES: No enlarged glands  MUSCULOSKELETAL: No joint pain or swelling; No muscle, back, or extremity pain      T(C): , Max: 36.5 (07-16-23 @ 16:44)  HR: 63 (07-17-23 @ 11:40)  BP: 112/65 (07-17-23 @ 11:40)  RR: 18 (07-17-23 @ 11:40)  SpO2: 96% (07-17-23 @ 11:40)  CAPILLARY BLOOD GLUCOSE        PHYSICAL EXAM:  GENERAL: NAD, lying in bed comfortably  HEAD:  Atraumatic, Normocephalic  EYES: EOMI, PERRLA, conjunctiva and sclera clear  ENT: Moist mucous membranes  NECK: Supple, No JVD  CHEST/LUNG: Clear to auscultation bilaterally; No rales, rhonchi, wheezing, or rubs. Unlabored respirations  HEART: Regular rate and rhythm; No murmurs, rubs, or gallops  ABDOMEN: Bowel sounds present; Soft, mildly ttp in LLQ, mild flank pain   EXTREMITIES:  2+ Peripheral Pulses, brisk capillary refill. No clubbing, cyanosis, or edema  NERVOUS SYSTEM:  Alert & Oriented X3, speech clear. No deficits   MSK: FROM all 4 extremities, full and equal strength  SKIN: No rashes or lesions    Consultant(s) Notes Reviewed:  [x ] YES  [ ] NO  Care Discussed with Consultants/Other Providers [ x] YES  [ ] NO      LABS:          12.2  7.55  )-------(338          36.1  N=52.4  L=31.1  MCV=92.3    140|106|12  ------------------<102<H>  4.1|24|0.6<L>  eGFR:--  Ca:8.7            Microbiology:    Culture - Blood (collected 07-15-23 @ 11:49)  Source: .Blood None  Preliminary Report (07-16-23 @ 20:01):    No growth at 24 hours    Culture - Urine (collected 07-14-23 @ 14:18)  Source: Clean Catch Clean Catch (Midstream)  Final Report (07-15-23 @ 23:14):    <10,000 CFU/mL Normal Urogenital Ritu        RADIOLOGY & ADDITIONAL TESTS:        Medications:  acetaminophen     Tablet .. 650 milliGRAM(s) Oral every 6 hours PRN  aluminum hydroxide/magnesium hydroxide/simethicone Suspension 30 milliLiter(s) Oral every 4 hours PRN  cefTRIAXone   IVPB 1000 milliGRAM(s) IV Intermittent every 24 hours  heparin   Injectable 5000 Unit(s) SubCutaneous every 12 hours  ketorolac   Injectable 15 milliGRAM(s) IV Push two times a day  melatonin 3 milliGRAM(s) Oral at bedtime PRN  metroNIDAZOLE  IVPB 500 milliGRAM(s) IV Intermittent every 8 hours  ondansetron Injectable 4 milliGRAM(s) IV Push every 8 hours PRN  pantoprazole    Tablet 40 milliGRAM(s) Oral before breakfast  sodium chloride 0.9%. 1000 milliLiter(s) IV Continuous <Continuous>      
  MAURO ALBERTO  [facility] [address] ([facility])      Patient is a 43y old  Female who presents with a chief complaint of Diverticulitis/UTI (15 Jul 2023 13:52)        Interval events:  Patient seen and examined at bedside. Says pain in LLQ is 3/10 from 5/10 yesterday. No nausea or vomiting. No fevers. Had 3 episodes of diarrhea in last 24 hours. Tolerating liquid diet.     -PMHx: No pertinent past medical history    UTI (urinary tract infection)    Ovarian cyst    Fibroids      -PSHx:No significant past surgical history            REVIEW OF SYSTEMS:  CONSTITUTIONAL: No fever, weight loss, or fatigue  RESPIRATORY: No cough, wheezing, chills or hemoptysis; No shortness of breath  CARDIOVASCULAR: No chest pain, palpitations, dizziness, or leg swelling  GASTROINTESTINAL: as above   NEUROLOGICAL: No headaches  LYMPH NODES: No enlarged glands  MUSCULOSKELETAL: No joint pain or swelling; No muscle, back, or extremity pain      T(C): , Max: 36.6 (07-15-23 @ 22:00)  HR: 63 (07-16-23 @ 08:20)  BP: 113/59 (07-16-23 @ 08:20)  RR: 18 (07-16-23 @ 08:20)  SpO2: 96% (07-16-23 @ 08:20)  CAPILLARY BLOOD GLUCOSE        PHYSICAL EXAM:  GENERAL: NAD, lying in bed comfortably  HEAD:  Atraumatic, Normocephalic  EYES: EOMI, PERRLA, conjunctiva and sclera clear  ENT: Moist mucous membranes  NECK: Supple, No JVD  CHEST/LUNG: Clear to auscultation bilaterally; No rales, rhonchi, wheezing, or rubs. Unlabored respirations  HEART: Regular rate and rhythm; No murmurs, rubs, or gallops  ABDOMEN: Bowel sounds present; Soft, mildly ttp in LLQ  EXTREMITIES:  2+ Peripheral Pulses, brisk capillary refill. No clubbing, cyanosis, or edema  NERVOUS SYSTEM:  Alert & Oriented X3, speech clear. No deficits   MSK: FROM all 4 extremities, full and equal strength  SKIN: No rashes or lesions    Consultant(s) Notes Reviewed:  [x ] YES  [ ] NO  Care Discussed with Consultants/Other Providers [ x] YES  [ ] NO        LABS:          12.5  7.19  )-------(327          37.2  N=53.1  L=29.6  MCV=92.3    140|109|7<L>  ------------------<104<H>  4.0|24|0.5<L>  eGFR:--  Ca:8.4            Microbiology:    Culture - Urine (collected 07-14-23 @ 14:18)  Source: Clean Catch Clean Catch (Midstream)  Final Report (07-15-23 @ 23:14):    <10,000 CFU/mL Normal Urogenital Ritu        RADIOLOGY & ADDITIONAL TESTS:  < from: CT Abdomen and Pelvis w/ IV Cont (07.14.23 @ 16:10) >    IMPRESSION:    Colonic diverticulosis with thickened sigmoid and distal descending   colonic wall with surrounding fat stranding, compatible with   diverticulitis/colitis. No drainable collection.    < end of copied text >        Medications:  acetaminophen     Tablet .. 650 milliGRAM(s) Oral every 6 hours PRN  aluminum hydroxide/magnesium hydroxide/simethicone Suspension 30 milliLiter(s) Oral every 4 hours PRN  cefTRIAXone   IVPB 1000 milliGRAM(s) IV Intermittent every 24 hours  dextrose 5% + sodium chloride 0.45%. 1000 milliLiter(s) IV Continuous <Continuous>  heparin   Injectable 5000 Unit(s) SubCutaneous every 12 hours  ketorolac   Injectable 15 milliGRAM(s) IV Push two times a day  melatonin 3 milliGRAM(s) Oral at bedtime PRN  metroNIDAZOLE  IVPB 500 milliGRAM(s) IV Intermittent every 8 hours  morphine  - Injectable 2 milliGRAM(s) IV Push every 4 hours PRN  ondansetron Injectable 4 milliGRAM(s) IV Push every 8 hours PRN  pantoprazole    Tablet 40 milliGRAM(s) Oral before breakfast

## 2023-07-18 NOTE — PROGRESS NOTE ADULT - TIME BILLING
Total time spent to complete patient's bedside assessment, physical examination, review medical chart including labs & imaging, discuss medical plan of care with housestaff was more than 35 minutes

## 2023-07-20 LAB
CULTURE RESULTS: SIGNIFICANT CHANGE UP
SPECIMEN SOURCE: SIGNIFICANT CHANGE UP

## 2023-07-24 DIAGNOSIS — K57.32 DIVERTICULITIS OF LARGE INTESTINE WITHOUT PERFORATION OR ABSCESS WITHOUT BLEEDING: ICD-10-CM

## 2023-07-24 DIAGNOSIS — E87.1 HYPO-OSMOLALITY AND HYPONATREMIA: ICD-10-CM

## 2023-07-24 DIAGNOSIS — N83.209 UNSPECIFIED OVARIAN CYST, UNSPECIFIED SIDE: ICD-10-CM

## 2023-07-24 DIAGNOSIS — A04.0 ENTEROPATHOGENIC ESCHERICHIA COLI INFECTION: ICD-10-CM

## 2023-07-24 DIAGNOSIS — D25.1 INTRAMURAL LEIOMYOMA OF UTERUS: ICD-10-CM

## 2023-07-24 DIAGNOSIS — R74.01 ELEVATION OF LEVELS OF LIVER TRANSAMINASE LEVELS: ICD-10-CM

## 2023-07-24 DIAGNOSIS — N39.0 URINARY TRACT INFECTION, SITE NOT SPECIFIED: ICD-10-CM

## 2023-09-13 NOTE — PROGRESS NOTE ADULT - ASSESSMENT
43y F pmh fibroids, ovarian cyst, UTI, nephrolithiasis, b/r tubal ligation 13 years ago presented to the ED complaining of LLQ abdominal pain x3 days, acute onset , progressively worse, aggravated with movement and a/w nausea and 3 episodes of loose stool. The patient also complained of dysuria and vaginal discharge x1 week. The patient had similar complains for 1 day about a week back.    #Acute diverticulitis/Colitis  #EPEC   -CT Abdomen and Pelvis w/ IV Cont: Colonic diverticulosis with thickened sigmoid and distal descending colonic wall with surrounding fat stranding, compatible with diverticulitis/colitis. No drainable collection.  -C. diff negative  -f/u fecal calprotectin: 155 (abnormal)   -GI PCR positive for EPEC   -pain improved, tolerating regular diet   -tolerating soft diet, can improve to regular diet   -cipro and flagyl for total 10 day course on d/c   -OP Follow up in 4-6 weeks with GI for colonoscopy  -pain control  -DO NOT GIVE IMODIUM     #Hyponatremia- resolved   -c/w hydration and liquid diet     #Transaminitis  -improving   -no c/o RUQ pain  -monitor LFT's     #DVT PPx: Heparin 5000u sq q12h     #Progress Note Handoff  Pending (specify): d/c   Family discussion: Patient well-informed and engaged in their care. In agreement.  Disposition: Home    Patient

## 2023-11-04 ENCOUNTER — EMERGENCY (EMERGENCY)
Facility: HOSPITAL | Age: 43
LOS: 0 days | Discharge: HOME | End: 2023-11-04
Attending: STUDENT IN AN ORGANIZED HEALTH CARE EDUCATION/TRAINING PROGRAM
Payer: MEDICAID

## 2023-11-04 VITALS
WEIGHT: 153 LBS | SYSTOLIC BLOOD PRESSURE: 113 MMHG | DIASTOLIC BLOOD PRESSURE: 69 MMHG | OXYGEN SATURATION: 97 % | TEMPERATURE: 97 F | HEART RATE: 66 BPM | RESPIRATION RATE: 18 BRPM
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
## 2023-11-04 DIAGNOSIS — S61.215A LACERATION WITHOUT FOREIGN BODY OF LEFT RING FINGER WITHOUT DAMAGE TO NAIL, INITIAL ENCOUNTER: ICD-10-CM

## 2023-11-04 DIAGNOSIS — Z23 ENCOUNTER FOR IMMUNIZATION: ICD-10-CM
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
## 2023-11-04 PROCEDURE — 99284 EMERGENCY DEPT VISIT MOD MDM: CPT | Mod: 25

## 2023-11-04 PROCEDURE — 90715 TDAP VACCINE 7 YRS/> IM: CPT

## 2023-11-04 PROCEDURE — 99283 EMERGENCY DEPT VISIT LOW MDM: CPT | Mod: 25

## 2023-11-04 PROCEDURE — 90471 IMMUNIZATION ADMIN: CPT

## 2023-11-04 PROCEDURE — 12001 RPR S/N/AX/GEN/TRNK 2.5CM/<: CPT

## 2023-11-04 RX ORDER — IBUPROFEN 200 MG
600 TABLET ORAL ONCE
Refills: 0 | Status: COMPLETED | OUTPATIENT
Start: 2023-11-04 | End: 2023-11-04

## 2023-11-04 RX ORDER — KETOROLAC TROMETHAMINE 30 MG/ML
15 SYRINGE (ML) INJECTION ONCE
Refills: 0 | Status: DISCONTINUED | OUTPATIENT
Start: 2023-11-04 | End: 2023-11-04

## 2023-11-04 RX ORDER — TETANUS TOXOID, REDUCED DIPHTHERIA TOXOID AND ACELLULAR PERTUSSIS VACCINE, ADSORBED 5; 2.5; 8; 8; 2.5 [IU]/.5ML; [IU]/.5ML; UG/.5ML; UG/.5ML; UG/.5ML
0.5 SUSPENSION INTRAMUSCULAR ONCE
Refills: 0 | Status: COMPLETED | OUTPATIENT
Start: 2023-11-04 | End: 2023-11-04

## 2023-11-04 RX ADMIN — TETANUS TOXOID, REDUCED DIPHTHERIA TOXOID AND ACELLULAR PERTUSSIS VACCINE, ADSORBED 0.5 MILLILITER(S): 5; 2.5; 8; 8; 2.5 SUSPENSION INTRAMUSCULAR at 11:30

## 2023-11-04 RX ADMIN — Medication 600 MILLIGRAM(S): at 11:30

## 2023-11-04 NOTE — ED ADULT NURSE NOTE - NSFALLUNIVINTERV_ED_ALL_ED
Bed/Stretcher in lowest position, wheels locked, appropriate side rails in place/Call bell, personal items and telephone in reach/Instruct patient to call for assistance before getting out of bed/chair/stretcher/Non-slip footwear applied when patient is off stretcher/Trail to call system/Physically safe environment - no spills, clutter or unnecessary equipment/Purposeful proactive rounding/Room/bathroom lighting operational, light cord in reach

## 2023-11-04 NOTE — ED PROVIDER NOTE - OBJECTIVE STATEMENT
44 y/o female with no past medical history presents to ED with complaints of laceration to her left hand that occurred today at 10am. Patient was in the kitchen using foil when she accidentally cut her left ring finger on the serrated edge of the foil box. She reports bleeding from the site, was able to have a pressure dressing applied and came to ED for evaluation. Pain to the site, but no other symptoms. Tetanus unknown.

## 2023-11-04 NOTE — ED ADULT TRIAGE NOTE - CHIEF COMPLAINT QUOTE
pt cut her left finger on the edge of the tin foil wrap quinonez edge, pt with moderate bleeding noted.

## 2023-11-04 NOTE — ED ADULT NURSE NOTE - BREATHING, MLM
unclear etiology possible uti acute cholecystitis  now on comfort care   continue morphine Spontaneous, unlabored and symmetrical

## 2023-11-04 NOTE — ED PROVIDER NOTE - NSFOLLOWUPCLINICS_GEN_ALL_ED_FT
[facility]  Hand  [address], [address]  [place] [address]  Phone: [phone]  Fax:   Follow Up Time: 4-6 Days

## 2023-11-04 NOTE — ED PROVIDER NOTE - CLINICAL SUMMARY MEDICAL DECISION MAKING FREE TEXT BOX
43 yr old female presents status post left.  Will give Tdap.  Repair laceration.  Will DC with strict precautions.  Will DC with hand follow-up.   Appropriate medications for patient's presenting complaints were ordered and effects were reassessed.  Patient's records (prior hospital, ED visit, and/or nursing home notes if available) were reviewed.  Additional history was obtained from EMS, family, and/or PCP (where available).  Escalation to admission/observation was considered.   However patient feels much better and is comfortable with discharge.  Appropriate follow-up was arranged.     I have discussed the discharge plan with the patient. The patient agrees with the plan, as discussed.  The patient understands Emergency Department diagnosis is a preliminary diagnosis often based on limited information and that the patient must adhere to the follow-up plan as discussed.  The patient understands that if the symptoms worsen or if prescribed medications do not have the desired/planned effect that the patient may return to the Emergency Department at any time for further evaluation and treatment.

## 2023-11-04 NOTE — ED PROCEDURE NOTE - CPROC ED TIME OUT STATEMENT1
“Patient's name, , procedure and correct site were confirmed during the [place] Timeout.”
“Patient's name, , procedure and correct site were confirmed during the [place] Timeout.”

## 2023-11-04 NOTE — ED PROVIDER NOTE - ATTENDING CONTRIBUTION TO CARE
43-year-old female with no past medical history who presents with a laceration.  Patient states that she was using foil in her kitchen when she cut the left ring finger against the serrated edge of the foil box.  Patient denies any numbness tingling or any other medical complaints.    VITAL SIGNS: I have reviewed nursing notes and confirm.  CONSTITUTIONAL: non-toxic, well appearing  SKIN: no rash, no petechiae.  EYES: EOMI, pink conjunctiva, anicteric  ENT: tongue midline, no exudates, MMM  NECK: Supple; no meningismus, no JVD  RESP:  no respiratory distress  EXT: Normal ROM x4. No edema. No calves tenderness. LUE: there is a 1-2 cm laceration noted to the dorsum of the L ring finger. no tendon or foreign visualized. pt has full rom at the PIP, DIP, digit, wrist, no swelling, sensation intact.   NEURO: Alert, oriented x3. CN2-12 intact, equal strength bilaterally, nl gait.  PSYCH: Cooperative, appropriate.    43 yr old female presents status post left.  Will give Tdap.  Repair laceration.  Will DC with strict precautions.  Will DC with hand follow-up.

## 2023-11-04 NOTE — ED PROVIDER NOTE - PHYSICAL EXAMINATION
VITAL SIGNS: I have reviewed nursing notes and confirm.  CONSTITUTIONAL: Well-developed; well-nourished; in no acute distress.  SKIN: 2 cm linear laceration noted to palmar aspect of left 3rd finger between MCP and PIP. Active bleeding, but controlled with direct pressure.   HEAD: Normocephalic; atraumatic.  EYES: PERRL, EOM intact; conjunctiva and sclera clear.  ENT: airway clear. TMs clear.  NECK: Supple  CARD: S1, S2 normal; no murmurs, gallops, or rubs. Regular rate and rhythm.  RESP: No wheezes, rales or rhonchi.  ABD: Normal bowel sounds; soft; non-distended; non-tender  EXT: Full ROM of left 3rd finger at MCP, PIP, and DIP. Good cap refill. 2+ radial pulses.   NEURO: alert, awake  PSYCH: Cooperative, appropriate.

## 2023-11-04 NOTE — ED PROVIDER NOTE - PATIENT PORTAL LINK FT
You can access the FollowMyHealth Patient Portal offered by [facility] by registering at the following website: [email or web address]. By joining [employer]’s FollowMyHealth portal, you will also be able to view your health information using other applications (apps) compatible with our system.

## 2023-11-04 NOTE — ED PROVIDER NOTE - NSFOLLOWUPINSTRUCTIONS_ED_ALL_ED_FT
Laceración  Dena laceración es un mary kate que atraviesa todas las capas de la piel hasta el tejido que se encuentra golden debajo de la piel. Algunas laceraciones sanan por sí solas. Otros deben cerrarse con tiras adhesivas para la piel, pegamento para la piel, puntos (suturas) o grapas. El cuidado adecuado de la laceración minimiza el riesgo de infección y ayuda a que la laceración sane mejor. Si se de luna colocado puntos o grapas no absorbibles, se deben retirar dentro del plazo indicado por murray proveedor de atención médica.  BUSQUE ATENCIÓN MÉDICA INMEDIATA SI TIENE ALGUNO DE LOS SIGUIENTES SÍNTOMAS: hinchazón alrededor de la herida, empeoramiento del dolor, drenaje de la herida, viktoriya dubose que se alejan de la herida, incapacidad para  el dedo de la mano o del pie cerca de la laceración o decoloración de la piel cerca de la herida. laceración. Nuestros coordinadores de referencias del departamento de emergencias se comunicarán con usted en las próximas 24 a  48 horas de 9:00 a. m. a 5:00 p. m. (de lunes a viernes) con valentín myesha de seguimiento. Espere valentín llamada telefónica del hospital en peggy período de tiempo. Si no recibe valentín llamada o si tiene alguna pregunta o inquietud, puede comunicarse con liudmilaos al [phone].      Laceración  Valentín laceración es un mary kate que atraviesa todas las capas de la piel hasta el tejido que se encuentra golden debajo de la piel. Algunas laceraciones sanan por sí solas. Otros deben cerrarse con tiras adhesivas para la piel, pegamento para la piel, puntos (suturas) o grapas. El cuidado adecuado de la laceración minimiza el riesgo de infección y ayuda a que la laceración sane mejor. Si se de luna colocado puntos o grapas no absorbibles, se deben retirar dentro del plazo indicado por murray proveedor de atención médica.  BUSQUE ATENCIÓN MÉDICA INMEDIATA SI TIENE ALGUNO DE LOS SIGUIENTES SÍNTOMAS: hinchazón alrededor de la herida, empeoramiento del dolor, drenaje de la herida, viktoriya dubose que se alejan de la herida, incapacidad para  el dedo de la mano o del pie cerca de la laceración o decoloración de la piel cerca de la herida. laceración.

## 2023-11-08 NOTE — CHART NOTE - NSCHARTNOTEFT_GEN_A_CORE
[facility] MRN [id number] / lily Estevez when dr kelley is in office / Appointment made - [facility]     Specialty: hand surgery  Date: November 15, 2023  Time: 10:15 AM  Location: [address] [place] [address]

## 2023-11-15 ENCOUNTER — APPOINTMENT (OUTPATIENT)
Dept: GASTROENTEROLOGY | Facility: CLINIC | Age: 43
End: 2023-11-15

## 2023-11-15 ENCOUNTER — APPOINTMENT (OUTPATIENT)
Dept: PLASTIC SURGERY | Facility: CLINIC | Age: 43
End: 2023-11-15

## 2024-03-13 ENCOUNTER — APPOINTMENT (OUTPATIENT)
Dept: GASTROENTEROLOGY | Facility: CLINIC | Age: 44
End: 2024-03-13

## 2024-04-22 NOTE — DISCUSSION/SUMMARY
[facility]: Cancer Care                                                                                          Met with Tim in clinic following his visit with Dr. Bunn. Introduced myself as his RN Care Coordinator at the [facility].    Provided Saeid with my contact information and encouraged him to call or MyChart with any needs. Additionally reminded him to contact the triage team for any symptom/side effect concerns.    Samantha Lincoln, RN, BSN  RN Care Coordinator  [facility]    
[FreeTextEntry1] : \par Rectocele-\par The patient is very happy with the postoperative results. All postoperative restrictions are lifted at 10 weeks postop. Advised to continue with the colace daily and to continue routine gyn care with her primary gyn provider. The risks and benefits of vesicare was reviewed and the patient agrees to restarting it for her urge incontinence. She will return in 6 weeks for follow up. The patient voiced understanding and agrees with the plan.\par \par

## 2024-08-08 NOTE — ED PROVIDER NOTE - NS_EDPROVIDERDISPOUSERTYPE_ED_A_ED
Neurology Consult Note    Date:8/8/2024       Room:[facility]19/019-A  Patient Name:Becca Keating     YOB: 1967     Age:56 y.o.    Requesting Physician: Carrie Castellanos APRN - CNP     Reason for Consult:  Evaluate for stroke like symptoms       Chief Complaint:   Chief Complaint   Patient presents with    Temporomandibular Joint Pain    Headache       Subjective     Becca Keating is a 56 y.o. female with a history of headache, arthritis, thyroid disease who presented to [facility] on 8/7/24 as a transfer from [facility]. She went to [facility] for her jaw \"crunching and popping\" when she eats which has been present for the last 2 weeks. She stated her speech was different around 12pm yesterday. She explained her speech was different than her normal as she was purposely moving her tongue to right. LKW 12pm 8/7/24. Per the ED note, it was reported that the patient had slurred speech, right facial droop, tongue deviation to the right, and decreased sensation her left arm and leg. A code stroke was called. CT head and CTA head and neck were negative. It was also noted that the patient's tongue deviation resolved and her speech \"markedly improved\" prior to transfer to [facility]. She has decreased sensation to her left face. No dysarthria noted. Tongue midline. The patient denies having facial droop and denies left arm and left leg decreased sensation. She denies any new weakness, vision changes, dizziness, lightheadedness, difficulty swallowing, confusion, headache, and facial droop. She has chronic tingling and numbness in her right leg along with chronic weakness. She had a lumbar spinal fusion in May 2023. With her chronic weakness in her right leg, she explained she is off balance at times but not more than normal. She explained she would have migraines frequently but has not had a migraine since her back surgery. She explained she never had this happen before. She has not seen anyone prior  effect.  The gray-white matter differentiation is preserved. The paranasal sinuses and mastoid air cells are normally aerated.  There is no suspicious calvarial abnormality.      1. Stable CT scan of the brain, no interval change since previous study dated 4/9/2020. 2. Results called to [facility] at 4:15 p.m. **This report has been created using voice recognition software. It may contain minor errors which are inherent in voice recognition technology.** Electronically signed by Dr. Zara Inman        Assessment and Plan:        Left TMJ pain with cheek sensory deficit      56 year old female who presented to [facility] for a two week history of \"jaw popping\". At [facility], there was concerns for a CVA as she was documented to have right facial droop, right tongue deviation, slurred speech, and decreased sensation to left arm and leg; therefore a code stroke was called. CT head negative for acute findings. CTA head and neck was read as no acute findings. Patient's tongue deviation resolved and her speech \"markedly improved\" prior to transfer to [facility]. Today, the patient explained her speech was different around 12pm yesterday. She explained her speech was different than her normal baseline as she was purposely moving her tongue to right. LKW 12pm 8/7/24.  She denies ever having left arm and left leg decreased sensation.  She does have decreased sensation to her left cheek with light touch. She denies having a facial droop. No facial droop upon exam. No dysarthria noted.  Tongue midline. Patient's dysarthria that was noted prior was likely related to her TMJ. MRI brain without contrast revealed no acute findings. Patient to follow-up outpatient for further evaluation of her TMJ pain per hospitalist's recommendation (dentist/oral surgeon/ENT etc). Upon Dr. Gaviria's review, there appears to be some irregularity/abnormal enhancement of the right thyroid on the CTA, otherwise normal. This was discussed with Dr. Inman (radiologist) who  I have personally evaluated and examined the patient. The Attending was available to me as a supervising provider if needed.

## 2024-08-21 ENCOUNTER — OUTPATIENT (OUTPATIENT)
Dept: OUTPATIENT SERVICES | Facility: HOSPITAL | Age: 44
LOS: 1 days | End: 2024-08-21
Payer: COMMERCIAL

## 2024-08-21 ENCOUNTER — APPOINTMENT (OUTPATIENT)
Dept: GASTROENTEROLOGY | Facility: CLINIC | Age: 44
End: 2024-08-21
Payer: COMMERCIAL

## 2024-08-21 ENCOUNTER — NON-APPOINTMENT (OUTPATIENT)
Age: 44
End: 2024-08-21

## 2024-08-21 VITALS
BODY MASS INDEX: 30.82 KG/M2 | DIASTOLIC BLOOD PRESSURE: 78 MMHG | OXYGEN SATURATION: 99 % | HEIGHT: 60 IN | SYSTOLIC BLOOD PRESSURE: 120 MMHG | HEART RATE: 60 BPM | WEIGHT: 157 LBS | TEMPERATURE: 97.7 F

## 2024-08-21 DIAGNOSIS — R10.32 LEFT LOWER QUADRANT PAIN: ICD-10-CM

## 2024-08-21 DIAGNOSIS — K21.9 GASTRO-ESOPHAGEAL REFLUX DISEASE W/OUT ESOPHAGITIS: ICD-10-CM

## 2024-08-21 DIAGNOSIS — R74.01 ELEVATION OF LEVELS OF LIVER TRANSAMINASE LEVELS: ICD-10-CM

## 2024-08-21 DIAGNOSIS — Z00.00 ENCOUNTER FOR GENERAL ADULT MEDICAL EXAMINATION WITHOUT ABNORMAL FINDINGS: ICD-10-CM

## 2024-08-21 PROCEDURE — 99214 OFFICE O/P EST MOD 30 MIN: CPT

## 2024-08-21 PROCEDURE — 99204 OFFICE O/P NEW MOD 45 MIN: CPT

## 2024-08-21 NOTE — PHYSICAL EXAM
[Alert] : alert [Normal Appearance] : the appearance of the neck was normal [No Respiratory Distress] : no respiratory distress [Heart Rate And Rhythm] : heart rate was normal and rhythm regular [Abdomen Soft] : soft [Abnormal Walk] : normal gait [Oriented To Time, Place, And Person] : oriented to person, place, and time [de-identified] : RLL tender on exam

## 2024-08-21 NOTE — ED ADULT TRIAGE NOTE - ESI TRIAGE ACUITY LEVEL, MLM
4 [] : Resident [FreeTextEntry3] : #preop-  has stable sob for 8 months but greater than 4 mets. and cataract low risk surgery so can proceed. . hold [id number] week before surgery   #obesity  #sunshine- walks 2 blocks can get sob. new the past 8 months. walking up 2 fligth of stairs and then gets sob. see cards for more evaluation. get ekg. had this few years ago then lost weight got better and active again the last 8 months- maybe due to asthma and heat outside [Time Spent: ___ minutes] : I have spent [unfilled] minutes of time on the encounter which excludes teaching and separately reported services.

## 2024-08-21 NOTE — HISTORY OF PRESENT ILLNESS
[FreeTextEntry1] : 44-year-old female is here for evaluation of abdominal pain in 7/2023 she was admitted to the hospital for acute uncomplicated sigmoid diverticulitis s/p ttt. and during hospitalization was found to have transaminitis with fatty liver on imaging. did not follow up for colonoscopy after. today she endorses LLQ abdominal pain associated with nausea, vomiting ,heart burn. denies diarrhea, melena , hematochezia. endorses wt loss.   never had egd or colonoscopy no family history of gi malignancy

## 2024-08-21 NOTE — ASSESSMENT
[FreeTextEntry1] : 44 year old female is here for evaluation of abdominal pain. in 7/2023 she was admitted to the hospital for acute uncomplicated sigmoid diverticulitis s/p ttt. and during hospitalization was found to have transaminitis with fatty liver on imaging. did not follow up for colonoscopy after. today she endorses LLQ abdominal pain associated with nausea, vomiting ,heart burn.  # LLQ abdominal pain: r/o recurrent sigmoid diverticulitis: - tenderness on palpation to LLQ pain - will send cipro and flagyl for 10 days - get CT A/P with IV contrast - advised to go to the ER for worsening of symptoms  - will schedule for colonoscopy in 2 months from now   # GERD, wt loss, dyspepsia: - will schedule for EGD at same time of colonoscopy   # Transaminitis: MARR likely - repeat LFTs

## 2024-08-22 RX ORDER — CIPROFLOXACIN HYDROCHLORIDE 500 MG/1
500 TABLET, FILM COATED ORAL TWICE DAILY
Qty: 20 | Refills: 0 | Status: ACTIVE | COMMUNITY
Start: 2024-08-21 | End: 1900-01-01

## 2024-08-22 RX ORDER — METRONIDAZOLE 500 MG/1
500 TABLET ORAL 3 TIMES DAILY
Qty: 30 | Refills: 0 | Status: ACTIVE | COMMUNITY
Start: 2024-08-21 | End: 1900-01-01

## 2024-08-28 DIAGNOSIS — R74.01 ELEVATION OF LEVELS OF LIVER TRANSAMINASE LEVELS: ICD-10-CM

## 2024-08-28 DIAGNOSIS — K21.9 GASTRO-ESOPHAGEAL REFLUX DISEASE WITHOUT ESOPHAGITIS: ICD-10-CM

## 2024-08-28 DIAGNOSIS — R10.32 LEFT LOWER QUADRANT PAIN: ICD-10-CM
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
## 2024-11-20 ENCOUNTER — TRANSCRIPTION ENCOUNTER (OUTPATIENT)
Age: 44
End: 2024-11-20

## 2024-11-20 ENCOUNTER — RESULT REVIEW (OUTPATIENT)
Age: 44
End: 2024-11-20

## 2024-11-20 ENCOUNTER — OUTPATIENT (OUTPATIENT)
Dept: OUTPATIENT SERVICES | Facility: HOSPITAL | Age: 44
LOS: 1 days | Discharge: HOME | End: 2024-11-20
Payer: COMMERCIAL

## 2024-11-20 VITALS
DIASTOLIC BLOOD PRESSURE: 68 MMHG | SYSTOLIC BLOOD PRESSURE: 117 MMHG | TEMPERATURE: 98 F | OXYGEN SATURATION: 97 % | WEIGHT: 160.06 LBS | RESPIRATION RATE: 18 BRPM | HEART RATE: 102 BPM

## 2024-11-20 VITALS
DIASTOLIC BLOOD PRESSURE: 71 MMHG | RESPIRATION RATE: 18 BRPM | SYSTOLIC BLOOD PRESSURE: 126 MMHG | HEART RATE: 102 BPM | OXYGEN SATURATION: 94 %

## 2024-11-20 DIAGNOSIS — R10.32 LEFT LOWER QUADRANT PAIN: ICD-10-CM

## 2024-11-20 DIAGNOSIS — Z98.51 TUBAL LIGATION STATUS: Chronic | ICD-10-CM

## 2024-11-20 DIAGNOSIS — K59.00 CONSTIPATION, UNSPECIFIED: ICD-10-CM

## 2024-11-20 PROCEDURE — 43239 EGD BIOPSY SINGLE/MULTIPLE: CPT | Mod: XS

## 2024-11-20 PROCEDURE — 88312 SPECIAL STAINS GROUP 1: CPT

## 2024-11-20 PROCEDURE — 88305 TISSUE EXAM BY PATHOLOGIST: CPT

## 2024-11-20 PROCEDURE — 88305 TISSUE EXAM BY PATHOLOGIST: CPT | Mod: 26

## 2024-11-20 PROCEDURE — 45385 COLONOSCOPY W/LESION REMOVAL: CPT

## 2024-11-20 PROCEDURE — 88312 SPECIAL STAINS GROUP 1: CPT | Mod: 26

## 2024-11-20 PROCEDURE — C1889: CPT

## 2024-11-20 NOTE — ASU DISCHARGE PLAN (ADULT/PEDIATRIC) - FINANCIAL ASSISTANCE
[facility] provides services at a reduced cost to those who are determined to be eligible through [facility]’s financial assistance program. Information regarding [facility]’s financial assistance program can be found by going to [email or web address] or by calling [phone].

## 2024-11-20 NOTE — ASU DISCHARGE PLAN (ADULT/PEDIATRIC) - NS MD DC FALL RISK RISK
For information on Fall & Injury Prevention, visit: [email or web address] OR  [email or web address] OR  https://www.cdc.gov/steadi/patient.html

## 2024-11-20 NOTE — ASU PATIENT PROFILE, ADULT - AS SC BRADEN MOISTURE
Patient comes to clinic for follow up anticoagulation visit.    Diagnosis: Inferior Mesenteric Vein Thrombosis .  Goal is 2.0 - 3.0.    Last INR 3/20/18  was >8.0 POC. Patient refused lab draw.  Warfarin dose placed on hold per protocol.   Today's INR is 9.3 and is above goal range.  Discussed with Dr. Manjarrez  Current warfarin dosing verified with patient.  Patient was informed that their INR result was above therapeutic range and instructed to Hold Warfarin 3/23/18, 3/24/18, and 3/25/18  per protocol. Discussed return date for next INR on 3/26/18    See Ambulatory Anticoagulation Flowsheet    AVS was declined by the patient.     Dr. Manjarrez is in the office today supervising the treatment. Note forwarded to physician for review.    Call your physician or seek medical care immediately if you notice any of the following symptoms of a bleed:   Red, dark, coffee or cola colored urine  Red or tar like stools  Excessive bleeding from gums or nose  Vomiting coffee colored or bright red material  Coughing up red tinged sputum  Severe or unprovoked pain (ex: severe Headache or Abdominal pain)  Sudden, spontaneous bruising for no reason  Excessive menstrual bleeding  A cut that will not stop bleeding within 10-15 mins  Symptoms associated with abnormal bleeding/high INR reviewed.    Encouraged to avoid activities that may result in a serious fall or injury and verbalizes understanding.    Patient was instructed to contact the clinic with any unusual bleeding or bruising, any changes in medications, diet, health status, lifestyle, or any other changes, questions or concerns. Patient verbalized understanding of all discussed.     
Patient comes to clinic for follow up anticoagulation visit.  DX: Mesenteric thrombosis Goal range: 2.0-3.0    LAST(INR) .8.0 on POC machine on 3/20.  Patient refused lab validation. Dose held for 2 days as had taken dose that am.  Recent gastric sleeve and dietary intake significantly altered.  Liquids and few small portions pureed protein.  Todays INR is >8 via POC.  Reluctantly agreed to lab validation.  Ordered stat and still pending.  Patient with family member.  Does not feel strong enough since surgery to wait for result.  See Ambulatory Anticoagulation Flow Sheet.    Call your physician or seek medical care immediately if you notice any of the following symptoms of a bleed:   Red, dark, coffee or cola colored urine  Red or tar like stools  Excessive bleeding from gums or nose  Vomiting coffee colored or bright red material  Coughing up red tinged sputum  Severe or unprovoked pain (ex: severe HA or Abd pain)  Sudden, spontaneous bruising for no reason  Excessive menstrual bleeding  A cut that will not stop bleeding within 10-15 mins  Symptoms associated with abnormal bleeding/high INR reviewed.  Patient encouraged to avoid activities that may result in a serious fall or injury and verbalizes understanding: Yes    Teaching: dose, return date, conditions requiring contact with [facility]. Dosing calendar provided.  Pt verbalized understanding of instructions and is aware of need to call with any questions or concerns and to report any changes in medications, health, diet and / or lifestyle.     Dr. Rojas is in office today supervising treatment. Note forwarded to physician for review.     
(4) rarely moist

## 2024-11-21 LAB
SURGICAL PATHOLOGY STUDY: SIGNIFICANT CHANGE UP
SURGICAL PATHOLOGY STUDY: SIGNIFICANT CHANGE UP

## 2024-11-26 DIAGNOSIS — K57.90 DIVERTICULOSIS OF INTESTINE, PART UNSPECIFIED, WITHOUT PERFORATION OR ABSCESS WITHOUT BLEEDING: ICD-10-CM

## 2024-11-26 DIAGNOSIS — B96.81 HELICOBACTER PYLORI [H. PYLORI] AS THE CAUSE OF DISEASES CLASSIFIED ELSEWHERE: ICD-10-CM

## 2024-11-26 DIAGNOSIS — Z12.11 ENCOUNTER FOR SCREENING FOR MALIGNANT NEOPLASM OF COLON: ICD-10-CM

## 2024-11-26 DIAGNOSIS — K29.50 UNSPECIFIED CHRONIC GASTRITIS WITHOUT BLEEDING: ICD-10-CM

## 2024-11-26 DIAGNOSIS — K63.5 POLYP OF COLON: ICD-10-CM

## 2024-11-26 DIAGNOSIS — K44.9 DIAPHRAGMATIC HERNIA WITHOUT OBSTRUCTION OR GANGRENE: ICD-10-CM

## 2024-11-26 DIAGNOSIS — K64.8 OTHER HEMORRHOIDS: ICD-10-CM

## 2024-11-26 DIAGNOSIS — K64.9 UNSPECIFIED HEMORRHOIDS: ICD-10-CM

## 2024-12-11 NOTE — ED ADULT NURSE NOTE - NSSUHOSCREENINGYN_ED_ALL_ED
This writer attempted to speak with patient's Aunt Glory, however she was not available to speak as she was at work and will call at a later time.    Yes - the patient is able to be screened

## 2025-01-27 NOTE — ED ADULT TRIAGE NOTE - MEANS OF ARRIVAL
Prior Authorization MMO    Procedure:  Rt NEO   Procedure Code: 87520  Diagnosis Code:  M16.11  Date:  2/10/2025  Facility:  [facility]  Status: OPT  Physician:  Alex Salas D.O.  Call Reference Number:  [id number]  Auth Number: [id number]  Valid:  2/10/2025 - 8/9/2025  [facility]   ambulatory

## 2025-02-10 NOTE — PATIENT PROFILE ADULT - NSPROPOAPRESSUREINJURY_GEN_A_NUR
----- Message from Dr. Sadiq Boogie MD sent at 2/9/2025  3:50 PM EST -----  Please advise aorta is looking stable.  Continue same.    My chart message sent.    no

## 2025-02-12 ENCOUNTER — OUTPATIENT (OUTPATIENT)
Dept: OUTPATIENT SERVICES | Facility: HOSPITAL | Age: 45
LOS: 1 days | End: 2025-02-12
Payer: COMMERCIAL

## 2025-02-12 ENCOUNTER — APPOINTMENT (OUTPATIENT)
Dept: GASTROENTEROLOGY | Facility: CLINIC | Age: 45
End: 2025-02-12
Payer: COMMERCIAL

## 2025-02-12 VITALS
DIASTOLIC BLOOD PRESSURE: 74 MMHG | HEIGHT: 60 IN | TEMPERATURE: 97.7 F | OXYGEN SATURATION: 96 % | SYSTOLIC BLOOD PRESSURE: 109 MMHG | BODY MASS INDEX: 31.41 KG/M2 | HEART RATE: 85 BPM | WEIGHT: 160 LBS

## 2025-02-12 DIAGNOSIS — Z98.51 TUBAL LIGATION STATUS: Chronic | ICD-10-CM

## 2025-02-12 DIAGNOSIS — K76.0 FATTY (CHANGE OF) LIVER, NOT ELSEWHERE CLASSIFIED: ICD-10-CM

## 2025-02-12 DIAGNOSIS — K59.00 CONSTIPATION, UNSPECIFIED: ICD-10-CM

## 2025-02-12 DIAGNOSIS — K21.9 GASTRO-ESOPHAGEAL REFLUX DISEASE W/OUT ESOPHAGITIS: ICD-10-CM

## 2025-02-12 DIAGNOSIS — Z00.00 ENCOUNTER FOR GENERAL ADULT MEDICAL EXAMINATION WITHOUT ABNORMAL FINDINGS: ICD-10-CM

## 2025-02-12 DIAGNOSIS — R10.32 LEFT LOWER QUADRANT PAIN: ICD-10-CM

## 2025-02-12 DIAGNOSIS — R74.01 ELEVATION OF LEVELS OF LIVER TRANSAMINASE LEVELS: ICD-10-CM

## 2025-02-12 PROCEDURE — 99214 OFFICE O/P EST MOD 30 MIN: CPT

## 2025-02-12 RX ORDER — POLYETHYLENE GLYCOL 3350 17 G/17G
17 POWDER, FOR SOLUTION ORAL TWICE DAILY
Qty: 1020 | Refills: 2 | Status: ACTIVE | COMMUNITY
Start: 2025-02-12 | End: 1900-01-01

## 2025-02-15 ENCOUNTER — RESULT REVIEW (OUTPATIENT)
Age: 45
End: 2025-02-15

## 2025-02-15 ENCOUNTER — INPATIENT (INPATIENT)
Facility: HOSPITAL | Age: 45
LOS: 0 days | Discharge: HOME | DRG: 115 | End: 2025-02-16
Attending: STUDENT IN AN ORGANIZED HEALTH CARE EDUCATION/TRAINING PROGRAM | Admitting: STUDENT IN AN ORGANIZED HEALTH CARE EDUCATION/TRAINING PROGRAM
Payer: COMMERCIAL

## 2025-02-15 ENCOUNTER — TRANSCRIPTION ENCOUNTER (OUTPATIENT)
Age: 45
End: 2025-02-15

## 2025-02-15 VITALS
DIASTOLIC BLOOD PRESSURE: 77 MMHG | OXYGEN SATURATION: 98 % | RESPIRATION RATE: 17 BRPM | WEIGHT: 160.06 LBS | TEMPERATURE: 98 F | HEIGHT: 58 IN | SYSTOLIC BLOOD PRESSURE: 123 MMHG | HEART RATE: 94 BPM
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
## 2025-02-15 DIAGNOSIS — T17.228A FOOD IN PHARYNX CAUSING OTHER INJURY, INITIAL ENCOUNTER: ICD-10-CM

## 2025-02-15 DIAGNOSIS — T17.208A UNSPECIFIED FOREIGN BODY IN PHARYNX CAUSING OTHER INJURY, INITIAL ENCOUNTER: ICD-10-CM
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
## 2025-02-15 DIAGNOSIS — Z98.51 TUBAL LIGATION STATUS: Chronic | ICD-10-CM

## 2025-02-15 LAB
ALBUMIN SERPL ELPH-MCNC: 4 G/DL — SIGNIFICANT CHANGE UP (ref 3.5–5.2)
ALP SERPL-CCNC: 82 U/L — SIGNIFICANT CHANGE UP (ref 30–115)
ALT FLD-CCNC: 61 U/L — HIGH (ref 0–41)
ANION GAP SERPL CALC-SCNC: 9 MMOL/L — SIGNIFICANT CHANGE UP (ref 7–14)
AST SERPL-CCNC: 32 U/L — SIGNIFICANT CHANGE UP (ref 0–41)
BASOPHILS # BLD AUTO: 0.04 K/UL — SIGNIFICANT CHANGE UP (ref 0–0.2)
BASOPHILS NFR BLD AUTO: 0.5 % — SIGNIFICANT CHANGE UP (ref 0–1)
BILIRUB SERPL-MCNC: <0.2 MG/DL — SIGNIFICANT CHANGE UP (ref 0.2–1.2)
BUN SERPL-MCNC: 16 MG/DL — SIGNIFICANT CHANGE UP (ref 10–20)
CALCIUM SERPL-MCNC: 8.4 MG/DL — SIGNIFICANT CHANGE UP (ref 8.4–10.5)
CHLORIDE SERPL-SCNC: 105 MMOL/L — SIGNIFICANT CHANGE UP (ref 98–110)
CO2 SERPL-SCNC: 23 MMOL/L — SIGNIFICANT CHANGE UP (ref 17–32)
CREAT SERPL-MCNC: 0.8 MG/DL — SIGNIFICANT CHANGE UP (ref 0.7–1.5)
EGFR: 93 ML/MIN/1.73M2 — SIGNIFICANT CHANGE UP
EGFR: 93 ML/MIN/1.73M2 — SIGNIFICANT CHANGE UP
EOSINOPHIL # BLD AUTO: 0.4 K/UL — SIGNIFICANT CHANGE UP (ref 0–0.7)
EOSINOPHIL NFR BLD AUTO: 4.5 % — SIGNIFICANT CHANGE UP (ref 0–8)
GLUCOSE SERPL-MCNC: 113 MG/DL — HIGH (ref 70–99)
HCT VFR BLD CALC: 38.4 % — SIGNIFICANT CHANGE UP (ref 37–47)
HGB BLD-MCNC: 12.9 G/DL — SIGNIFICANT CHANGE UP (ref 12–16)
IMM GRANULOCYTES NFR BLD AUTO: 0.3 % — SIGNIFICANT CHANGE UP (ref 0.1–0.3)
LYMPHOCYTES # BLD AUTO: 2.71 K/UL — SIGNIFICANT CHANGE UP (ref 1.2–3.4)
LYMPHOCYTES # BLD AUTO: 30.6 % — SIGNIFICANT CHANGE UP (ref 20.5–51.1)
MCHC RBC-ENTMCNC: 30.5 PG — SIGNIFICANT CHANGE UP (ref 27–31)
MCHC RBC-ENTMCNC: 33.6 G/DL — SIGNIFICANT CHANGE UP (ref 32–37)
MCV RBC AUTO: 90.8 FL — SIGNIFICANT CHANGE UP (ref 81–99)
MONOCYTES # BLD AUTO: 1.09 K/UL — HIGH (ref 0.1–0.6)
MONOCYTES NFR BLD AUTO: 12.3 % — HIGH (ref 1.7–9.3)
NEUTROPHILS # BLD AUTO: 4.59 K/UL — SIGNIFICANT CHANGE UP (ref 1.4–6.5)
NEUTROPHILS NFR BLD AUTO: 51.8 % — SIGNIFICANT CHANGE UP (ref 42.2–75.2)
NRBC BLD AUTO-RTO: 0 /100 WBCS — SIGNIFICANT CHANGE UP (ref 0–0)
PLATELET # BLD AUTO: 289 K/UL — SIGNIFICANT CHANGE UP (ref 130–400)
PMV BLD: 10.1 FL — SIGNIFICANT CHANGE UP (ref 7.4–10.4)
POTASSIUM SERPL-MCNC: 4.3 MMOL/L — SIGNIFICANT CHANGE UP (ref 3.5–5)
POTASSIUM SERPL-SCNC: 4.3 MMOL/L — SIGNIFICANT CHANGE UP (ref 3.5–5)
PROT SERPL-MCNC: 6.5 G/DL — SIGNIFICANT CHANGE UP (ref 6–8)
RBC # BLD: 4.23 M/UL — SIGNIFICANT CHANGE UP (ref 4.2–5.4)
RBC # FLD: 15.2 % — HIGH (ref 11.5–14.5)
SODIUM SERPL-SCNC: 137 MMOL/L — SIGNIFICANT CHANGE UP (ref 135–146)
WBC # BLD: 8.86 K/UL — SIGNIFICANT CHANGE UP (ref 4.8–10.8)
WBC # FLD AUTO: 8.86 K/UL — SIGNIFICANT CHANGE UP (ref 4.8–10.8)

## 2025-02-15 PROCEDURE — 88300 SURGICAL PATH GROSS: CPT | Mod: 26

## 2025-02-15 PROCEDURE — 70360 X-RAY EXAM OF NECK: CPT | Mod: 26

## 2025-02-15 PROCEDURE — 31530 LARYNGOSCOPY W/FB REMOVAL: CPT

## 2025-02-15 PROCEDURE — 85025 COMPLETE CBC W/AUTO DIFF WBC: CPT

## 2025-02-15 PROCEDURE — 36415 COLL VENOUS BLD VENIPUNCTURE: CPT

## 2025-02-15 PROCEDURE — 80053 COMPREHEN METABOLIC PANEL: CPT

## 2025-02-15 PROCEDURE — 88300 SURGICAL PATH GROSS: CPT

## 2025-02-15 PROCEDURE — 99285 EMERGENCY DEPT VISIT HI MDM: CPT

## 2025-02-15 RX ORDER — METOCLOPRAMIDE HCL 10 MG
10 TABLET ORAL ONCE
Refills: 0 | Status: DISCONTINUED | OUTPATIENT
Start: 2025-02-15 | End: 2025-02-16

## 2025-02-15 RX ORDER — HYDROMORPHONE/SOD CHLOR,ISO/PF 2 MG/10 ML
0.5 SYRINGE (ML) INJECTION
Refills: 0 | Status: DISCONTINUED | OUTPATIENT
Start: 2025-02-15 | End: 2025-02-16

## 2025-02-15 RX ORDER — SODIUM CHLORIDE 9 G/1000ML
1000 INJECTION, SOLUTION INTRAVENOUS
Refills: 0 | Status: DISCONTINUED | OUTPATIENT
Start: 2025-02-15 | End: 2025-02-16

## 2025-02-15 RX ORDER — ACETAMINOPHEN 500 MG/5ML
1000 LIQUID (ML) ORAL ONCE
Refills: 0 | Status: DISCONTINUED | OUTPATIENT
Start: 2025-02-15 | End: 2025-02-16

## 2025-02-15 RX ORDER — ONDANSETRON HCL/PF 4 MG/2 ML
8 VIAL (ML) INJECTION ONCE
Refills: 0 | Status: DISCONTINUED | OUTPATIENT
Start: 2025-02-15 | End: 2025-02-16

## 2025-02-15 RX ORDER — HYDROMORPHONE/SOD CHLOR,ISO/PF 2 MG/10 ML
1 SYRINGE (ML) INJECTION
Refills: 0 | Status: DISCONTINUED | OUTPATIENT
Start: 2025-02-15 | End: 2025-02-16

## 2025-02-15 RX ADMIN — Medication 1000 MILLILITER(S): at 22:03

## 2025-02-16 VITALS
DIASTOLIC BLOOD PRESSURE: 60 MMHG | OXYGEN SATURATION: 100 % | HEART RATE: 80 BPM | RESPIRATION RATE: 14 BRPM | SYSTOLIC BLOOD PRESSURE: 120 MMHG

## 2025-02-17 DIAGNOSIS — K59.00 CONSTIPATION, UNSPECIFIED: ICD-10-CM

## 2025-02-17 DIAGNOSIS — K21.9 GASTRO-ESOPHAGEAL REFLUX DISEASE WITHOUT ESOPHAGITIS: ICD-10-CM

## 2025-02-17 DIAGNOSIS — R74.01 ELEVATION OF LEVELS OF LIVER TRANSAMINASE LEVELS: ICD-10-CM

## 2025-02-17 DIAGNOSIS — R10.32 LEFT LOWER QUADRANT PAIN: ICD-10-CM

## 2025-02-17 DIAGNOSIS — K76.0 FATTY (CHANGE OF) LIVER, NOT ELSEWHERE CLASSIFIED: ICD-10-CM

## 2025-02-19 LAB — SURGICAL PATHOLOGY STUDY: SIGNIFICANT CHANGE UP

## 2025-03-19 NOTE — ED PROVIDER NOTE - PRINCIPAL DIAGNOSIS
Marcos Dinero called to request a refill on his medication.      Last office visit : 8/13/2024  Next office visit : Visit date not found     Requested Prescriptions      No prescriptions requested or ordered in this encounter            Aunradha Cárdenas LPN   
Diverticulitis

## 2025-04-10 ENCOUNTER — NON-APPOINTMENT (OUTPATIENT)
Age: 45
End: 2025-04-10

## 2025-04-18 ENCOUNTER — APPOINTMENT (OUTPATIENT)
Dept: GASTROENTEROLOGY | Facility: CLINIC | Age: 45
End: 2025-04-18

## 2025-08-01 ENCOUNTER — APPOINTMENT (OUTPATIENT)
Dept: OBGYN | Facility: CLINIC | Age: 45
End: 2025-08-01
Payer: COMMERCIAL

## 2025-08-01 VITALS — WEIGHT: 168 LBS | SYSTOLIC BLOOD PRESSURE: 122 MMHG | BODY MASS INDEX: 32.81 KG/M2 | DIASTOLIC BLOOD PRESSURE: 80 MMHG

## 2025-08-01 DIAGNOSIS — N89.8 OTHER SPECIFIED NONINFLAMMATORY DISORDERS OF VAGINA: ICD-10-CM

## 2025-08-01 DIAGNOSIS — Z01.419 ENCOUNTER FOR GYNECOLOGICAL EXAMINATION (GENERAL) (ROUTINE) W/OUT ABNORMAL FINDINGS: ICD-10-CM

## 2025-08-01 DIAGNOSIS — N63.0 UNSPECIFIED LUMP IN UNSPECIFIED BREAST: ICD-10-CM

## 2025-08-01 PROCEDURE — 99386 PREV VISIT NEW AGE 40-64: CPT

## 2025-08-01 PROCEDURE — 99459 PELVIC EXAMINATION: CPT

## 2025-08-01 RX ORDER — FLUCONAZOLE 150 MG/1
150 TABLET ORAL
Qty: 1 | Refills: 4 | Status: ACTIVE | COMMUNITY
Start: 2025-08-01 | End: 1900-01-01

## 2025-08-03 LAB
BV BACTERIA RRNA VAG QL NAA+PROBE: NOT DETECTED
C GLABRATA RNA VAG QL NAA+PROBE: NOT DETECTED
C TRACH RRNA SPEC QL NAA+PROBE: NOT DETECTED
CANDIDA RRNA VAG QL PROBE: NOT DETECTED
ESTRADIOL SERPL-MCNC: 65 PG/ML
FSH SERPL-MCNC: 15.8 IU/L
N GONORRHOEA RRNA SPEC QL NAA+PROBE: NOT DETECTED
PROLACTIN SERPL-MCNC: 65.9 NG/ML
T VAGINALIS RRNA SPEC QL NAA+PROBE: NOT DETECTED
TSH SERPL-ACNC: 3.13 UIU/ML

## 2025-08-04 LAB — HPV HIGH+LOW RISK DNA PNL CVX: NOT DETECTED

## 2025-08-05 DIAGNOSIS — E22.1 HYPERPROLACTINEMIA: ICD-10-CM

## 2025-08-12 ENCOUNTER — RESULT REVIEW (OUTPATIENT)
Age: 45
End: 2025-08-12

## 2025-08-12 ENCOUNTER — EMERGENCY (EMERGENCY)
Facility: HOSPITAL | Age: 45
LOS: 0 days | Discharge: HOME | End: 2025-08-12
Attending: EMERGENCY MEDICINE
Payer: COMMERCIAL

## 2025-08-12 ENCOUNTER — OUTPATIENT (OUTPATIENT)
Dept: OUTPATIENT SERVICES | Facility: HOSPITAL | Age: 45
LOS: 1 days | End: 2025-08-12
Payer: COMMERCIAL

## 2025-08-12 VITALS
SYSTOLIC BLOOD PRESSURE: 131 MMHG | TEMPERATURE: 98 F | OXYGEN SATURATION: 99 % | RESPIRATION RATE: 18 BRPM | HEIGHT: 64 IN | DIASTOLIC BLOOD PRESSURE: 79 MMHG | HEART RATE: 81 BPM | WEIGHT: 167.99 LBS

## 2025-08-12 DIAGNOSIS — N63.10 UNSPECIFIED LUMP IN THE RIGHT BREAST, UNSPECIFIED QUADRANT: ICD-10-CM

## 2025-08-12 DIAGNOSIS — Z98.51 TUBAL LIGATION STATUS: Chronic | ICD-10-CM

## 2025-08-12 PROCEDURE — 73630 X-RAY EXAM OF FOOT: CPT | Mod: 26,LT

## 2025-08-12 PROCEDURE — 76641 ULTRASOUND BREAST COMPLETE: CPT | Mod: 26,50

## 2025-08-12 PROCEDURE — 99284 EMERGENCY DEPT VISIT MOD MDM: CPT

## 2025-08-12 PROCEDURE — 76641 ULTRASOUND BREAST COMPLETE: CPT | Mod: 50

## 2025-08-12 PROCEDURE — 77066 DX MAMMO INCL CAD BI: CPT

## 2025-08-12 PROCEDURE — 77062 BREAST TOMOSYNTHESIS BI: CPT | Mod: 26

## 2025-08-12 PROCEDURE — 77066 DX MAMMO INCL CAD BI: CPT | Mod: 26

## 2025-08-12 PROCEDURE — 73630 X-RAY EXAM OF FOOT: CPT | Mod: LT

## 2025-08-12 PROCEDURE — G0279: CPT

## 2025-08-12 PROCEDURE — 99283 EMERGENCY DEPT VISIT LOW MDM: CPT | Mod: 25

## 2025-08-12 RX ORDER — IBUPROFEN 200 MG
600 TABLET ORAL ONCE
Refills: 0 | Status: COMPLETED | OUTPATIENT
Start: 2025-08-12 | End: 2025-08-12

## 2025-08-12 RX ADMIN — Medication 600 MILLIGRAM(S): at 22:25

## 2025-08-13 DIAGNOSIS — N63.10 UNSPECIFIED LUMP IN THE RIGHT BREAST, UNSPECIFIED QUADRANT: ICD-10-CM

## 2025-08-21 ENCOUNTER — APPOINTMENT (OUTPATIENT)
Dept: PODIATRY | Facility: CLINIC | Age: 45
End: 2025-08-21